# Patient Record
Sex: MALE | Race: WHITE | Employment: OTHER | URBAN - NONMETROPOLITAN AREA
[De-identification: names, ages, dates, MRNs, and addresses within clinical notes are randomized per-mention and may not be internally consistent; named-entity substitution may affect disease eponyms.]

---

## 2022-04-06 ENCOUNTER — APPOINTMENT (OUTPATIENT)
Dept: ULTRASOUND IMAGING | Age: 57
DRG: 853 | End: 2022-04-06
Payer: MEDICARE

## 2022-04-06 ENCOUNTER — HOSPITAL ENCOUNTER (INPATIENT)
Age: 57
LOS: 5 days | Discharge: HOME | DRG: 853 | End: 2022-04-11
Attending: INTERNAL MEDICINE | Admitting: INTERNAL MEDICINE
Payer: MEDICARE

## 2022-04-06 ENCOUNTER — APPOINTMENT (OUTPATIENT)
Dept: CT IMAGING | Age: 57
DRG: 853 | End: 2022-04-06
Payer: MEDICARE

## 2022-04-06 DIAGNOSIS — K81.0 ACUTE CHOLECYSTITIS: Primary | ICD-10-CM

## 2022-04-06 LAB
ALBUMIN SERPL-MCNC: 4.6 G/DL (ref 3.5–5.1)
ALP BLD-CCNC: 191 U/L (ref 38–126)
ALT SERPL-CCNC: 165 U/L (ref 11–66)
ANION GAP SERPL CALCULATED.3IONS-SCNC: 21 MEQ/L (ref 8–16)
AST SERPL-CCNC: 159 U/L (ref 5–40)
AVERAGE GLUCOSE: 171 MG/DL (ref 70–126)
BACTERIA: ABNORMAL /HPF
BASE EXCESS MIXED: -0.4 MMOL/L (ref -2–3)
BASOPHILS # BLD: 0.7 %
BASOPHILS ABSOLUTE: 0.1 THOU/MM3 (ref 0–0.1)
BILIRUB SERPL-MCNC: 2.9 MG/DL (ref 0.3–1.2)
BILIRUBIN DIRECT: 1.9 MG/DL (ref 0–0.3)
BILIRUBIN URINE: NEGATIVE
BLOOD, URINE: NEGATIVE
BUN BLDV-MCNC: 9 MG/DL (ref 7–22)
CALCIUM SERPL-MCNC: 10.3 MG/DL (ref 8.5–10.5)
CASTS 2: ABNORMAL /LPF
CASTS UA: ABNORMAL /LPF
CHARACTER, URINE: CLEAR
CHLORIDE BLD-SCNC: 95 MEQ/L (ref 98–111)
CO2: 24 MEQ/L (ref 23–33)
COLLECTED BY:: ABNORMAL
COLOR: YELLOW
CREAT SERPL-MCNC: 0.8 MG/DL (ref 0.4–1.2)
CRYSTALS, UA: ABNORMAL
EKG ATRIAL RATE: 81 BPM
EKG P AXIS: 38 DEGREES
EKG P-R INTERVAL: 160 MS
EKG Q-T INTERVAL: 360 MS
EKG QRS DURATION: 88 MS
EKG QTC CALCULATION (BAZETT): 418 MS
EKG R AXIS: 71 DEGREES
EKG T AXIS: 69 DEGREES
EKG VENTRICULAR RATE: 81 BPM
EOSINOPHIL # BLD: 0.2 %
EOSINOPHILS ABSOLUTE: 0 THOU/MM3 (ref 0–0.4)
EPITHELIAL CELLS, UA: ABNORMAL /HPF
ERYTHROCYTE [DISTWIDTH] IN BLOOD BY AUTOMATED COUNT: 14.9 % (ref 11.5–14.5)
ERYTHROCYTE [DISTWIDTH] IN BLOOD BY AUTOMATED COUNT: 45.5 FL (ref 35–45)
GFR SERPL CREATININE-BSD FRML MDRD: > 90 ML/MIN/1.73M2
GLUCOSE BLD-MCNC: 161 MG/DL (ref 70–108)
GLUCOSE BLD-MCNC: 259 MG/DL (ref 70–108)
GLUCOSE URINE: >= 1000 MG/DL
HBA1C MFR BLD: 7.7 % (ref 4.4–6.4)
HCO3, MIXED: 25 MMOL/L (ref 23–28)
HCT VFR BLD CALC: 61 % (ref 42–52)
HEMOGLOBIN: 19.7 GM/DL (ref 14–18)
IMMATURE GRANS (ABS): 0.1 THOU/MM3 (ref 0–0.07)
IMMATURE GRANULOCYTES: 0.9 %
KETONES, URINE: 40
LACTIC ACID: 2.1 MMOL/L (ref 0.5–2)
LEUKOCYTE ESTERASE, URINE: NEGATIVE
LIPASE: 44.4 U/L (ref 5.6–51.3)
LIPASE: 49.1 U/L (ref 5.6–51.3)
LYMPHOCYTES # BLD: 9.8 %
LYMPHOCYTES ABSOLUTE: 1.1 THOU/MM3 (ref 1–4.8)
MCH RBC QN AUTO: 29 PG (ref 26–33)
MCHC RBC AUTO-ENTMCNC: 32.3 GM/DL (ref 32.2–35.5)
MCV RBC AUTO: 89.8 FL (ref 80–94)
MISCELLANEOUS 2: ABNORMAL
MONOCYTES # BLD: 5.4 %
MONOCYTES ABSOLUTE: 0.6 THOU/MM3 (ref 0.4–1.3)
NITRITE, URINE: NEGATIVE
NUCLEATED RED BLOOD CELLS: 0 /100 WBC
O2 SAT, MIXED: 86 %
OSMOLALITY CALCULATION: 287 MOSMOL/KG (ref 275–300)
PATHOLOGIST REVIEW: ABNORMAL
PCO2, MIXED VENOUS: 42 MMHG (ref 41–51)
PH UA: 7.5 (ref 5–9)
PH, MIXED: 7.38 (ref 7.31–7.41)
PLATELET # BLD: 196 THOU/MM3 (ref 130–400)
PMV BLD AUTO: 9.9 FL (ref 9.4–12.4)
PO2 MIXED: 52 MMHG (ref 25–40)
POTASSIUM REFLEX MAGNESIUM: 3.8 MEQ/L (ref 3.5–5.2)
PROCALCITONIN: 0.13 NG/ML (ref 0.01–0.09)
PROTEIN UA: ABNORMAL
RBC # BLD: 6.79 MILL/MM3 (ref 4.7–6.1)
RBC URINE: ABNORMAL /HPF
RENAL EPITHELIAL, UA: ABNORMAL
SCAN OF BLOOD SMEAR: NORMAL
SEG NEUTROPHILS: 83 %
SEGMENTED NEUTROPHILS ABSOLUTE COUNT: 9.4 THOU/MM3 (ref 1.8–7.7)
SODIUM BLD-SCNC: 140 MEQ/L (ref 135–145)
SPECIFIC GRAVITY, URINE: > 1.03 (ref 1–1.03)
TOTAL PROTEIN: 8.7 G/DL (ref 6.1–8)
TROPONIN T: < 0.01 NG/ML
UROBILINOGEN, URINE: 1 EU/DL (ref 0–1)
WBC # BLD: 11.3 THOU/MM3 (ref 4.8–10.8)
WBC UA: ABNORMAL /HPF
YEAST: ABNORMAL

## 2022-04-06 PROCEDURE — 93010 ELECTROCARDIOGRAM REPORT: CPT | Performed by: INTERNAL MEDICINE

## 2022-04-06 PROCEDURE — 93306 TTE W/DOPPLER COMPLETE: CPT

## 2022-04-06 PROCEDURE — 6360000004 HC RX CONTRAST MEDICATION: Performed by: PHYSICIAN ASSISTANT

## 2022-04-06 PROCEDURE — 36415 COLL VENOUS BLD VENIPUNCTURE: CPT

## 2022-04-06 PROCEDURE — 74177 CT ABD & PELVIS W/CONTRAST: CPT

## 2022-04-06 PROCEDURE — 99284 EMERGENCY DEPT VISIT MOD MDM: CPT

## 2022-04-06 PROCEDURE — 99223 1ST HOSP IP/OBS HIGH 75: CPT | Performed by: INTERNAL MEDICINE

## 2022-04-06 PROCEDURE — 83690 ASSAY OF LIPASE: CPT

## 2022-04-06 PROCEDURE — 81001 URINALYSIS AUTO W/SCOPE: CPT

## 2022-04-06 PROCEDURE — 6360000002 HC RX W HCPCS: Performed by: PHYSICIAN ASSISTANT

## 2022-04-06 PROCEDURE — 94761 N-INVAS EAR/PLS OXIMETRY MLT: CPT

## 2022-04-06 PROCEDURE — 93005 ELECTROCARDIOGRAM TRACING: CPT | Performed by: PHYSICIAN ASSISTANT

## 2022-04-06 PROCEDURE — 85025 COMPLETE CBC W/AUTO DIFF WBC: CPT

## 2022-04-06 PROCEDURE — 6370000000 HC RX 637 (ALT 250 FOR IP): Performed by: INTERNAL MEDICINE

## 2022-04-06 PROCEDURE — 84145 PROCALCITONIN (PCT): CPT

## 2022-04-06 PROCEDURE — 80048 BASIC METABOLIC PNL TOTAL CA: CPT

## 2022-04-06 PROCEDURE — 6360000002 HC RX W HCPCS: Performed by: INTERNAL MEDICINE

## 2022-04-06 PROCEDURE — 82803 BLOOD GASES ANY COMBINATION: CPT

## 2022-04-06 PROCEDURE — 2700000000 HC OXYGEN THERAPY PER DAY

## 2022-04-06 PROCEDURE — 76705 ECHO EXAM OF ABDOMEN: CPT

## 2022-04-06 PROCEDURE — 2580000003 HC RX 258: Performed by: INTERNAL MEDICINE

## 2022-04-06 PROCEDURE — 2580000003 HC RX 258: Performed by: PHYSICIAN ASSISTANT

## 2022-04-06 PROCEDURE — 83605 ASSAY OF LACTIC ACID: CPT

## 2022-04-06 PROCEDURE — 6360000002 HC RX W HCPCS: Performed by: SURGERY

## 2022-04-06 PROCEDURE — 96375 TX/PRO/DX INJ NEW DRUG ADDON: CPT

## 2022-04-06 PROCEDURE — 96374 THER/PROPH/DIAG INJ IV PUSH: CPT

## 2022-04-06 PROCEDURE — 84484 ASSAY OF TROPONIN QUANT: CPT

## 2022-04-06 PROCEDURE — 83036 HEMOGLOBIN GLYCOSYLATED A1C: CPT

## 2022-04-06 PROCEDURE — 82948 REAGENT STRIP/BLOOD GLUCOSE: CPT

## 2022-04-06 PROCEDURE — 99221 1ST HOSP IP/OBS SF/LOW 40: CPT | Performed by: SURGERY

## 2022-04-06 PROCEDURE — 1200000003 HC TELEMETRY R&B

## 2022-04-06 PROCEDURE — 80076 HEPATIC FUNCTION PANEL: CPT

## 2022-04-06 RX ORDER — ONDANSETRON 2 MG/ML
4 INJECTION INTRAMUSCULAR; INTRAVENOUS EVERY 30 MIN PRN
Status: DISCONTINUED | OUTPATIENT
Start: 2022-04-06 | End: 2022-04-06

## 2022-04-06 RX ORDER — DEXTROSE MONOHYDRATE 25 G/50ML
12.5 INJECTION, SOLUTION INTRAVENOUS PRN
Status: DISCONTINUED | OUTPATIENT
Start: 2022-04-06 | End: 2022-04-06 | Stop reason: SDUPTHER

## 2022-04-06 RX ORDER — RISPERIDONE 1 MG/1
1 TABLET, FILM COATED ORAL DAILY
COMMUNITY

## 2022-04-06 RX ORDER — FLUTICASONE PROPIONATE 50 MCG
1 SPRAY, SUSPENSION (ML) NASAL DAILY
COMMUNITY

## 2022-04-06 RX ORDER — ASPIRIN 81 MG/1
81 TABLET, CHEWABLE ORAL DAILY
COMMUNITY

## 2022-04-06 RX ORDER — MONTELUKAST SODIUM 10 MG/1
10 TABLET ORAL NIGHTLY
COMMUNITY

## 2022-04-06 RX ORDER — ALBUTEROL SULFATE 2.5 MG/3ML
5 SOLUTION RESPIRATORY (INHALATION) EVERY 4 HOURS PRN
Status: DISCONTINUED | OUTPATIENT
Start: 2022-04-06 | End: 2022-04-11 | Stop reason: HOSPADM

## 2022-04-06 RX ORDER — PANTOPRAZOLE SODIUM 40 MG/10ML
40 INJECTION, POWDER, LYOPHILIZED, FOR SOLUTION INTRAVENOUS DAILY
Status: DISCONTINUED | OUTPATIENT
Start: 2022-04-06 | End: 2022-04-11 | Stop reason: HOSPADM

## 2022-04-06 RX ORDER — DEXTROSE MONOHYDRATE 50 MG/ML
100 INJECTION, SOLUTION INTRAVENOUS PRN
Status: DISCONTINUED | OUTPATIENT
Start: 2022-04-06 | End: 2022-04-11 | Stop reason: HOSPADM

## 2022-04-06 RX ORDER — DEXTROSE MONOHYDRATE 50 MG/ML
100 INJECTION, SOLUTION INTRAVENOUS PRN
Status: DISCONTINUED | OUTPATIENT
Start: 2022-04-06 | End: 2022-04-06 | Stop reason: SDUPTHER

## 2022-04-06 RX ORDER — MORPHINE SULFATE 30 MG/1
30 TABLET, FILM COATED, EXTENDED RELEASE ORAL 3 TIMES DAILY
Status: DISCONTINUED | OUTPATIENT
Start: 2022-04-06 | End: 2022-04-11 | Stop reason: HOSPADM

## 2022-04-06 RX ORDER — PANTOPRAZOLE SODIUM 40 MG/1
40 TABLET, DELAYED RELEASE ORAL 2 TIMES DAILY
COMMUNITY

## 2022-04-06 RX ORDER — BISOPROLOL FUMARATE 5 MG/1
5 TABLET ORAL NIGHTLY
COMMUNITY
End: 2022-05-26

## 2022-04-06 RX ORDER — IPRATROPIUM BROMIDE 21 UG/1
2 SPRAY, METERED NASAL DAILY
COMMUNITY

## 2022-04-06 RX ORDER — TAMSULOSIN HYDROCHLORIDE 0.4 MG/1
0.4 CAPSULE ORAL NIGHTLY
COMMUNITY

## 2022-04-06 RX ORDER — ATORVASTATIN CALCIUM 40 MG/1
40 TABLET, FILM COATED ORAL NIGHTLY
COMMUNITY

## 2022-04-06 RX ORDER — NICOTINE POLACRILEX 4 MG
15 LOZENGE BUCCAL PRN
Status: DISCONTINUED | OUTPATIENT
Start: 2022-04-06 | End: 2022-04-06 | Stop reason: CLARIF

## 2022-04-06 RX ORDER — POTASSIUM CHLORIDE 7.45 MG/ML
10 INJECTION INTRAVENOUS PRN
Status: DISCONTINUED | OUTPATIENT
Start: 2022-04-06 | End: 2022-04-11 | Stop reason: HOSPADM

## 2022-04-06 RX ORDER — ALOGLIPTIN 12.5 MG/1
12.5 TABLET, FILM COATED ORAL DAILY
Status: DISCONTINUED | OUTPATIENT
Start: 2022-04-06 | End: 2022-04-10

## 2022-04-06 RX ORDER — MAGNESIUM SULFATE IN WATER 40 MG/ML
2000 INJECTION, SOLUTION INTRAVENOUS PRN
Status: DISCONTINUED | OUTPATIENT
Start: 2022-04-06 | End: 2022-04-11 | Stop reason: HOSPADM

## 2022-04-06 RX ORDER — BISACODYL 10 MG
10 SUPPOSITORY, RECTAL RECTAL DAILY PRN
Status: DISCONTINUED | OUTPATIENT
Start: 2022-04-06 | End: 2022-04-11 | Stop reason: HOSPADM

## 2022-04-06 RX ORDER — GLIMEPIRIDE 4 MG/1
4 TABLET ORAL
COMMUNITY

## 2022-04-06 RX ORDER — SODIUM CHLORIDE 9 MG/ML
INJECTION, SOLUTION INTRAVENOUS PRN
Status: DISCONTINUED | OUTPATIENT
Start: 2022-04-06 | End: 2022-04-11 | Stop reason: HOSPADM

## 2022-04-06 RX ORDER — DULOXETIN HYDROCHLORIDE 30 MG/1
30 CAPSULE, DELAYED RELEASE ORAL DAILY
Status: DISCONTINUED | OUTPATIENT
Start: 2022-04-07 | End: 2022-04-11 | Stop reason: HOSPADM

## 2022-04-06 RX ORDER — DULOXETIN HYDROCHLORIDE 30 MG/1
30 CAPSULE, DELAYED RELEASE ORAL DAILY
COMMUNITY

## 2022-04-06 RX ORDER — TAMSULOSIN HYDROCHLORIDE 0.4 MG/1
0.4 CAPSULE ORAL NIGHTLY
Status: DISCONTINUED | OUTPATIENT
Start: 2022-04-06 | End: 2022-04-11 | Stop reason: HOSPADM

## 2022-04-06 RX ORDER — HYDROCODONE BITARTRATE AND ACETAMINOPHEN 10; 325 MG/1; MG/1
1 TABLET ORAL 3 TIMES DAILY
COMMUNITY

## 2022-04-06 RX ORDER — FLUTICASONE FUROATE, UMECLIDINIUM BROMIDE AND VILANTEROL TRIFENATATE 100; 62.5; 25 UG/1; UG/1; UG/1
1 POWDER RESPIRATORY (INHALATION) DAILY
COMMUNITY

## 2022-04-06 RX ORDER — SODIUM CHLORIDE 0.9 % (FLUSH) 0.9 %
5-40 SYRINGE (ML) INJECTION EVERY 12 HOURS SCHEDULED
Status: DISCONTINUED | OUTPATIENT
Start: 2022-04-06 | End: 2022-04-11 | Stop reason: HOSPADM

## 2022-04-06 RX ORDER — KETOCONAZOLE 20 MG/ML
SHAMPOO TOPICAL DAILY PRN
COMMUNITY

## 2022-04-06 RX ORDER — 0.9 % SODIUM CHLORIDE 0.9 %
1000 INTRAVENOUS SOLUTION INTRAVENOUS ONCE
Status: DISCONTINUED | OUTPATIENT
Start: 2022-04-06 | End: 2022-04-08

## 2022-04-06 RX ORDER — METOPROLOL TARTRATE 50 MG/1
50 TABLET, FILM COATED ORAL NIGHTLY
Status: ON HOLD | COMMUNITY
End: 2022-04-07

## 2022-04-06 RX ORDER — MORPHINE SULFATE 30 MG/1
30 TABLET ORAL 3 TIMES DAILY
Status: ON HOLD | COMMUNITY
End: 2022-04-06 | Stop reason: ALTCHOICE

## 2022-04-06 RX ORDER — LORAZEPAM 0.5 MG/1
0.5 TABLET ORAL NIGHTLY
COMMUNITY

## 2022-04-06 RX ORDER — MORPHINE SULFATE 30 MG/1
30 TABLET, FILM COATED, EXTENDED RELEASE ORAL 3 TIMES DAILY
COMMUNITY

## 2022-04-06 RX ORDER — PREGABALIN 200 MG/1
200 CAPSULE ORAL 3 TIMES DAILY
COMMUNITY

## 2022-04-06 RX ORDER — SODIUM CHLORIDE, SODIUM LACTATE, POTASSIUM CHLORIDE, CALCIUM CHLORIDE 600; 310; 30; 20 MG/100ML; MG/100ML; MG/100ML; MG/100ML
INJECTION, SOLUTION INTRAVENOUS CONTINUOUS
Status: DISCONTINUED | OUTPATIENT
Start: 2022-04-06 | End: 2022-04-07

## 2022-04-06 RX ORDER — POTASSIUM CHLORIDE 20 MEQ/1
40 TABLET, EXTENDED RELEASE ORAL PRN
Status: DISCONTINUED | OUTPATIENT
Start: 2022-04-06 | End: 2022-04-11 | Stop reason: HOSPADM

## 2022-04-06 RX ORDER — MELOXICAM 15 MG/1
15 TABLET ORAL DAILY
COMMUNITY

## 2022-04-06 RX ORDER — SODIUM CHLORIDE 0.9 % (FLUSH) 0.9 %
5-40 SYRINGE (ML) INJECTION PRN
Status: DISCONTINUED | OUTPATIENT
Start: 2022-04-06 | End: 2022-04-11 | Stop reason: HOSPADM

## 2022-04-06 RX ORDER — 0.9 % SODIUM CHLORIDE 0.9 %
1000 INTRAVENOUS SOLUTION INTRAVENOUS ONCE
Status: COMPLETED | OUTPATIENT
Start: 2022-04-06 | End: 2022-04-06

## 2022-04-06 RX ORDER — ACETAMINOPHEN 650 MG/1
650 SUPPOSITORY RECTAL EVERY 6 HOURS PRN
Status: DISCONTINUED | OUTPATIENT
Start: 2022-04-06 | End: 2022-04-11 | Stop reason: HOSPADM

## 2022-04-06 RX ORDER — POLYETHYLENE GLYCOL 3350 17 G/17G
17 POWDER, FOR SOLUTION ORAL DAILY PRN
Status: DISCONTINUED | OUTPATIENT
Start: 2022-04-06 | End: 2022-04-11 | Stop reason: HOSPADM

## 2022-04-06 RX ORDER — ACETAMINOPHEN 325 MG/1
650 TABLET ORAL EVERY 6 HOURS PRN
Status: DISCONTINUED | OUTPATIENT
Start: 2022-04-06 | End: 2022-04-11 | Stop reason: HOSPADM

## 2022-04-06 RX ORDER — NICOTINE POLACRILEX 4 MG
15 LOZENGE BUCCAL PRN
Status: DISCONTINUED | OUTPATIENT
Start: 2022-04-06 | End: 2022-04-06 | Stop reason: SDUPTHER

## 2022-04-06 RX ORDER — MONTELUKAST SODIUM 10 MG/1
10 TABLET ORAL NIGHTLY
Status: DISCONTINUED | OUTPATIENT
Start: 2022-04-06 | End: 2022-04-11 | Stop reason: HOSPADM

## 2022-04-06 RX ORDER — DEXTROSE MONOHYDRATE 25 G/50ML
12.5 INJECTION, SOLUTION INTRAVENOUS PRN
Status: DISCONTINUED | OUTPATIENT
Start: 2022-04-06 | End: 2022-04-06 | Stop reason: CLARIF

## 2022-04-06 RX ORDER — ATORVASTATIN CALCIUM 40 MG/1
40 TABLET, FILM COATED ORAL DAILY
Status: DISCONTINUED | OUTPATIENT
Start: 2022-04-06 | End: 2022-04-11 | Stop reason: HOSPADM

## 2022-04-06 RX ORDER — HYDROCODONE BITARTRATE AND ACETAMINOPHEN 10; 325 MG/1; MG/1
1 TABLET ORAL 3 TIMES DAILY
Status: DISCONTINUED | OUTPATIENT
Start: 2022-04-06 | End: 2022-04-10

## 2022-04-06 RX ORDER — RISPERIDONE 1 MG/1
1 TABLET, FILM COATED ORAL DAILY
Status: DISCONTINUED | OUTPATIENT
Start: 2022-04-07 | End: 2022-04-11 | Stop reason: HOSPADM

## 2022-04-06 RX ORDER — DESONIDE 0.5 MG/G
CREAM TOPICAL 2 TIMES DAILY
COMMUNITY

## 2022-04-06 RX ADMIN — HYDROMORPHONE HYDROCHLORIDE 1 MG: 1 INJECTION, SOLUTION INTRAMUSCULAR; INTRAVENOUS; SUBCUTANEOUS at 21:10

## 2022-04-06 RX ADMIN — ONDANSETRON 4 MG: 2 INJECTION INTRAMUSCULAR; INTRAVENOUS at 13:38

## 2022-04-06 RX ADMIN — METOPROLOL TARTRATE 25 MG: 50 TABLET, FILM COATED ORAL at 20:55

## 2022-04-06 RX ADMIN — MONTELUKAST SODIUM 10 MG: 10 TABLET, FILM COATED ORAL at 20:55

## 2022-04-06 RX ADMIN — IOPAMIDOL 80 ML: 755 INJECTION, SOLUTION INTRAVENOUS at 14:02

## 2022-04-06 RX ADMIN — MORPHINE SULFATE 30 MG: 30 TABLET, FILM COATED, EXTENDED RELEASE ORAL at 20:55

## 2022-04-06 RX ADMIN — INSULIN LISPRO 1 UNITS: 100 INJECTION, SOLUTION INTRAVENOUS; SUBCUTANEOUS at 21:05

## 2022-04-06 RX ADMIN — TAMSULOSIN HYDROCHLORIDE 0.4 MG: 0.4 CAPSULE ORAL at 20:55

## 2022-04-06 RX ADMIN — SODIUM CHLORIDE 1000 ML: 9 INJECTION, SOLUTION INTRAVENOUS at 13:44

## 2022-04-06 RX ADMIN — ENOXAPARIN SODIUM 40 MG: 100 INJECTION SUBCUTANEOUS at 20:56

## 2022-04-06 RX ADMIN — SODIUM CHLORIDE, POTASSIUM CHLORIDE, SODIUM LACTATE AND CALCIUM CHLORIDE: 600; 310; 30; 20 INJECTION, SOLUTION INTRAVENOUS at 17:38

## 2022-04-06 RX ADMIN — SODIUM CHLORIDE, PRESERVATIVE FREE 10 ML: 5 INJECTION INTRAVENOUS at 20:56

## 2022-04-06 RX ADMIN — PREGABALIN 200 MG: 75 CAPSULE ORAL at 20:56

## 2022-04-06 RX ADMIN — HYDROMORPHONE HYDROCHLORIDE 1 MG: 1 INJECTION, SOLUTION INTRAMUSCULAR; INTRAVENOUS; SUBCUTANEOUS at 13:44

## 2022-04-06 ASSESSMENT — PAIN DESCRIPTION - LOCATION
LOCATION: ABDOMEN;FLANK

## 2022-04-06 ASSESSMENT — ENCOUNTER SYMPTOMS
COUGH: 0
SHORTNESS OF BREATH: 0
COLOR CHANGE: 0
SORE THROAT: 0
DIARRHEA: 0
NAUSEA: 0
ABDOMINAL PAIN: 1
VOMITING: 0
CONSTIPATION: 0
CHEST TIGHTNESS: 0
ABDOMINAL DISTENTION: 0

## 2022-04-06 ASSESSMENT — PAIN DESCRIPTION - ORIENTATION
ORIENTATION: MID;LEFT;RIGHT
ORIENTATION: RIGHT;LEFT;MID

## 2022-04-06 ASSESSMENT — PAIN DESCRIPTION - FREQUENCY
FREQUENCY: CONTINUOUS

## 2022-04-06 ASSESSMENT — PAIN SCALES - GENERAL
PAINLEVEL_OUTOF10: 9
PAINLEVEL_OUTOF10: 3
PAINLEVEL_OUTOF10: 5

## 2022-04-06 ASSESSMENT — PAIN DESCRIPTION - PAIN TYPE
TYPE: ACUTE PAIN;CHRONIC PAIN
TYPE: ACUTE PAIN;CHRONIC PAIN

## 2022-04-06 ASSESSMENT — PAIN DESCRIPTION - DESCRIPTORS
DESCRIPTORS: DISCOMFORT
DESCRIPTORS: DISCOMFORT
DESCRIPTORS: STABBING

## 2022-04-06 NOTE — ED NOTES
Pt resting in room with wife, pt is now on tele monitor. Pt was at 88% SPO2 on room air so pt was put on nasal cannula 2L/min and is now at 98% SPO2. VSS and no further requests from pt at this time.       Reid Appl  04/06/22 [id number]

## 2022-04-06 NOTE — ED TRIAGE NOTES
Pt presents to ED with abdominal pain and flank pain that began last week. Pt states that the pain worsened this morning. Pt states he has been drinking well but has not eaten since last night. Pt denies any painful urination but stated his urine was \"dark\" last week but has been normal recently. Pt denies any vomiting or loose stools. IV inserted and EKG was taken. VSS and pt resting in room with wife.

## 2022-04-06 NOTE — H&P
Hospitalist History and Physical          Patient: Eliza Pradhan  : 1965  MRN: [id number]     Acct: [de-identified]    PCP: No primary care provider on file. Date of Admission: 2022  Date of Service: Pt seen/examined on 22  and Admitted to Inpatient with expected LOS greater than two midnights due to medical therapy. Hospital Problems           Last Modified POA    * (Principal) Acute cholecystitis 2022 Yes          Assessment and Plan:   Acute Cholecystitis: ?cholangitis, + cholecystitis but no biliary dilation seen on CT abd/pelv. Lipase wnl, hepatitis as noted below. Suspect patient also passed a stone about a week ago as well based on his symptoms he describes. o Empiric Zosyn, f/up liver US  o Cardiology consult for preop eval  o NPO, IVF (repeat bolus followed by maintenance)  o D/w Gen Surgery - tentatively planning surgery for Friday pending Cardio eval  o Dilaudid 1/1.5mg PRN (may need higher dose given significant home pain requirements). Bowel regimen is available.  Obstructive Transaminitis (on history of FLD): likely secondary to above. Alk phos 191, , , bilirubin 2.9 with a direct bilirubin of 1.9. There was no biliary ductal dilatation, possible passed stone. o Will follow up on US liver. o Trend lfts  o Avoid hepatotoxins   ? Sepsis: with WBC, near tachycardia secondary to above. See above for management  o Check lactic acid. Aggressive fluids. Antibiotics.  Elevated anion gap: will check VBG to determine if there is acidosis present. Bicarb on BMP is wnl, but does not rule out a mixed disorder. Patient does take SGLT2 inhibitor so euglycemic DKA would be in differential diagnosis.  Steatosis/Hepatomegaly: noted. Advise weight loss.  Asthma: no in acute exacerbation. He is on singulair and Trelegy, but takes the inhaler prn.   o Will start albuterol prn and IS   Abnormal EKG / CAD s/p 4v CABG: CABG in  with Dr. Jimy Yu. Reports he has not seen a Cardiologist for some time. He takes ASA and two BB (lopressor and bisoprolol) in addition to statin  o Continue on ASA if OK with gen surgery  o EKG reviewed - no baseline - there is TWF in II, V3 and TWI in I, AVL V1-2  - Could be stress response, patient denies chest pain  - Continue ASA for now  o Consult Cardiology given unknown history and high risk  o Obtain echo for baseline  o Initial troponin is negative.  DM2 with hyperglycemia: on farxiga, januvia, trulicity and amaryl PTA. o Hold oral meds  o SSI while npo, hypoglycemia protocol  o Checking A1c.    Erythrocytosis: likely 2/2 dehydration, however patient is on testosterone so may need to stop this therapy if remains. o Monitor CBC   Depression: on risperdal, cymbalta - continue   GEENA with inability to tolerate CPAP. Will need close monitoring postop.  Anxiety: on lorazapam nightly 0.5mg.  Chronic Back Pain: patient is chronically on MS Contin 30mg TID, Lyrica, Mobic, and Norco 10mg 5x daily prn. He follows his PCP for this. I reviewed PDMP.  Morbid Obesity: BMI 41, advise weight loss.  Former tobacco          =======================================================================      Chief Complaint:  Abdominal pain    History Of Present Illness:  Magdalena Aguilar is a 61 y.o. male with PMHx of asthma, obesity, GEENA with noncompliance, CAD status post CABG, former tobacco abuse who presents to [facility] with abdominal pain. Patient reports that this started last night suddenly and was described as a sharp pain in the right upper quadrant which radiated along his costal margin to the right posteriorly. Nothing made it better or worse, he reports a poor appetite and did not eat much during this time. He had some nausea but denied any vomiting. Admitted to loose stools.   He reports that he had similar symptoms about a week ago which lasted 2 to 3 days and spontaneously resolved on their own.  During that time he had dark/orange urine. He denies any associated fevers or chills, shortness of breath, chest pain or palpitations. He has chronic dyspnea on exertion that he attributes to his asthma however this is not become any worse. The patient is a former tobacco user. He had a CABG in 2013 and is maintained on aspirin and bisoprolol/metoprolol and is currently followed by his PCP, has not seen a cardiologist in some time. Denies any anginal chest pain. He also has chronic back pain and prior surgery, maintained on MS Contin 30 mg 3 times daily, Norco  as needed, Lyrica, and Mobic by his PCP. He does not drink any alcohol. ED course: Patient's vital signs revealed afebrile with hypertension. BMP showed anion gap of 21 however CO2 level within normal limits. Glucose was 259, procalcitonin 0.13. AST and  and 165, alk phos 191, bilirubin 2.9. WBC 11.3 and hemoglobin 19.7 with a hematocrit of 61. UA positive for ketones, greater than 1000 glucose, trace protein. CT of the abdomen pelvis with IV contrast showed \"marked gallbladder distention with high density sludge/stones layering within the gallbladder and mild pericholecystic edema suggests acute cholecystitis in the appropriate clinical setting. No biliary ductal dilatation is observed\"    Past Medical History:    History reviewed. No pertinent past medical history. Past Surgical History:    History reviewed. No pertinent surgical history. Medications Prior to Admission:   Prior to Admission medications    Medication Sig Start Date End Date Taking?  Authorizing Provider   fluticasone-umeclidin-vilant (TRELEGY ELLIPTA) 100-62.5-25 MCG/INH AEPB Inhale 1 puff into the lungs daily   Yes Historical Provider, MD   mometasone-formoterol (DULERA) 200-5 MCG/ACT inhaler Inhale 2 puffs into the lungs every 12 hours   Yes Historical Provider, MD   fluticasone (FLONASE) 50 MCG/ACT nasal spray 1 spray by Each Nostril route daily Yes Historical Provider, MD   Dulaglutide (TRULICITY SC) Inject into the skin once a week   Yes Historical Provider, MD   glimepiride (AMARYL) 4 MG tablet Take 4 mg by mouth every morning (before breakfast)   Yes Historical Provider, MD   risperiDONE (RISPERDAL) 1 MG tablet Take 1 mg by mouth daily   Yes Historical Provider, MD   DULoxetine (CYMBALTA) 30 MG extended release capsule Take 30 mg by mouth daily   Yes Historical Provider, MD   HYDROcodone-acetaminophen (NORCO)  MG per tablet Take 1 tablet by mouth in the morning, at noon, and at bedtime. Yes Historical Provider, MD   pregabalin (LYRICA) 200 MG capsule Take 200 mg by mouth in the morning, at noon, and at bedtime. Yes Historical Provider, MD   morphine (MSIR) 30 MG tablet Take 30 mg by mouth in the morning, at noon, and at bedtime. Yes Historical Provider, MD   montelukast (SINGULAIR) 10 MG tablet Take 10 mg by mouth nightly   Yes Historical Provider, MD   tamsulosin (FLOMAX) 0.4 MG capsule Take 0.4 mg by mouth daily   Yes Historical Provider, MD   atorvastatin (LIPITOR) 40 MG tablet Take 40 mg by mouth daily   Yes Historical Provider, MD   pantoprazole (PROTONIX) 40 MG tablet Take 40 mg by mouth daily   Yes Historical Provider, MD   dapagliflozin (FARXIGA) 10 MG tablet Take 10 mg by mouth every morning   Yes Historical Provider, MD   bisoprolol (ZEBETA) 5 MG tablet Take 5 mg by mouth daily   Yes Historical Provider, MD   SITagliptin (JANUVIA) 100 MG tablet Take 100 mg by mouth daily   Yes Historical Provider, MD   metoprolol tartrate (LOPRESSOR) 50 MG tablet Take 50 mg by mouth daily   Yes Historical Provider, MD   LORazepam (ATIVAN) 0.5 MG tablet Take 0.5 mg by mouth daily as needed for Anxiety. Yes Historical Provider, MD   meloxicam (MOBIC) 15 MG tablet Take 15 mg by mouth daily   Yes Historical Provider, MD   ketoconazole (NIZORAL) 2 % shampoo Apply topically daily as needed for Itching Apply topically daily as needed.    Yes Historical Provider, MD   desonide (DESOWEN) 0.05 % cream Apply topically 2 times daily Apply topically 2 times daily. Yes Historical Provider, MD   ipratropium (ATROVENT) 0.03 % nasal spray 2 sprays by Each Nostril route daily   Yes Historical Provider, MD       Allergies:  Abilify [aripiprazole] and Metformin and related    Social History:    The patient currently lives home. Tobacco use:   reports that he has never smoked. He has never used smokeless tobacco.  Alcohol use:   reports current alcohol use. Drug use:  reports no history of drug use. Family History:   as follows:  History reviewed. No pertinent family history. Review of Systems:   Pertinent positives and negatives as noted in the HPI. All other systems reviewed and negative. Physical Exam:    BP (!) 157/88   Pulse 84   Temp 98.6 °F (37 °C) (Oral)   Resp 18   Ht 5' 8\" (1.727 m)   Wt 270 lb (122.5 kg)   SpO2 98%   BMI 41.05 kg/m²       General appearance: Obese, mild distress  Eyes:  Pupils equal, round, and reactive to light. Conjunctivae/corneas clear. HENT: Head normal in appearance. External nares normal.  Oral mucosa Dry without lesions. Hearing grossly intact. Neck: Supple, with full range of motion. Trachea midline. No gross JVD appreciated. Respiratory:  Normal respiratory effort. Clear to auscultation, bilaterally without rales or wheezes or rhonchi. Diminished overall. Cardiovascular: Normal rate, regular rhythm with normal S1/S2 without murmurs. No lower extremity edema. Abdomen: Normoactive bs. Protuberant, there is pain to palpation in the RUQ. No rebound. Mild distension  Musculoskeletal: There is no joint swelling or tenderness. Normal tone. No abnormal movements. Skin: Warm and dry. Violaceous macules of the bilateral shins anteriorly. Neurologic:  No focal sensory/motor deficits in the upper and lower extremities. Cranial nerves:  grossly non-focal 2-12.      Psychiatric: Alert and oriented, normal insight and thought content. Capillary Refill: Brisk,< 3 seconds. Peripheral Pulses: +2 palpable, equal bilaterally. Labs:     Recent Labs     04/06/22  1240   WBC 11.3*   HGB 19.7*   HCT 61.0*        Recent Labs     04/06/22  1240      K 3.8   CL 95*   CO2 24   BUN 9   CREATININE 0.8   CALCIUM 10.3     Recent Labs     04/06/22  1240   *   *   BILIDIR 1.9*   BILITOT 2.9*   ALKPHOS 191*     No results for input(s): INR in the last 72 hours. No results for input(s): Abiel Oiler in the last 72 hours. Lab Results   Component Value Date    NITRU NEGATIVE 04/06/2022    WBCUA NONE SEEN 04/06/2022    BACTERIA NONE SEEN 04/06/2022    RBCUA 0-2 04/06/2022    BLOODU NEGATIVE 04/06/2022    GLUCOSEU >= 1000 04/06/2022         Radiology:     CT ABDOMEN PELVIS W IV CONTRAST Additional Contrast? None   Final Result   1. Marked gallbladder distention with high density sludge/stones layering within the gallbladder and mild pericholecystic edema suggests acute cholecystitis in the appropriate clinical setting. No biliary ductal dilatation is observed. Correlate with    liver function tests. 2. Normal appendix. Colonic diverticulosis without diverticulitis. Moderate retained fecal material seen throughout the colon suggesting fecal stasis. No bowel obstruction. 3. Chronic findings are discussed. **This report has been created using voice recognition software. It may contain minor errors which are inherent in voice recognition technology. **      Final report electronically signed by Dr Herminia Hwang on 4/6/2022 2:19 PM      US ABDOMEN COMPLETE    (Results Pending)          EKG:  I have reviewed the EKG with the following interpretation: T wave inversion in 1, aVL, V3, T wave flattening V1 through 2 and inferior leads.       PT/OT Eval Status:  will be assessed  Diet: No diet orders on file  DVT prophylaxis: heparin subq, hold preop  Code Status: No Order  Disposition: admit to med surge     Thank you No primary care provider on file. for the opportunity to be involved in this patient's care.     Electronically signed by Dany Perez DO on 4/6/2022 at 3:07 PM.

## 2022-04-06 NOTE — ED PROVIDER NOTES
[facility]    EMERGENCY MEDICINE     Pt Name: Jalil Colon  MRN: [id number]  Armskaitygfkathleen 1965  Date of evaluation: 4/6/2022  Provider: Jackeline Haskins PA-C    CHIEF COMPLAINT       Chief Complaint   Patient presents with    Abdominal Pain    Flank Pain       HISTORY OF PRESENT ILLNESS    Jalil Colon is a pleasant 61 y.o. male who presents to the emergency department for right upper quadrant abdominal and right flank pain. Patient reports he had similar abdominal pain about a week ago that was not as severe and noted dark urine during this time. After about 2 days the pain dissipated. The current episode of abdominal pain started last night around 2 AM after he ate something at 11 PM and has continued to increase in intensity prompting him to come in this morning. The pain is mainly upper abdomen but worse in the right side. He feels short of breath secondary to pain and states it hurts his stomach when he takes a breath in. He complains of being diaphoretic but denies chest pain, nausea, vomiting, fevers/chills, diarrhea, constipation, or urinary symptoms. Currently he rates the pain 9/10 sharp and dull ache. He is in pain management for prior back surgeries and takes morphine 30 mg 3 times daily and Norco 10 mg up to 4 times daily as needed. Patient also has history of diabetes and COPD. He has inhaler at home in which he uses daily for his COPD. Triage notes and Nursing notes were reviewed by myself. Any discrepancies are addressed above.     PAST MEDICAL HISTORY     Past Medical History:   Diagnosis Date    CAD (coronary artery disease)     Diabetes mellitus ([facility] [address]     Hyperlipidemia     Hypertension     Movement disorder     Neuromuscular disorder ([facility]     Pneumonia     Psychiatric problem        SURGICAL HISTORY       Past Surgical History:   Procedure Laterality Date    BACK SURGERY      CARDIAC SURGERY      COLONOSCOPY      ENDOSCOPY, COLON, DIAGNOSTIC CURRENT MEDICATIONS       Current Discharge Medication List      CONTINUE these medications which have NOT CHANGED    Details   metoprolol succinate (TOPROL XL) 50 MG extended release tablet Take 50 mg by mouth daily      fluticasone-umeclidin-vilant (TRELEGY ELLIPTA) 100-62.5-25 MCG/INH AEPB Inhale 1 puff into the lungs daily      fluticasone (FLONASE) 50 MCG/ACT nasal spray 1 spray by Each Nostril route daily      Dulaglutide (TRULICITY SC) Inject into the skin once a week Patient takes each Sunday evening      glimepiride (AMARYL) 4 MG tablet Take 4 mg by mouth every morning (before breakfast)      risperiDONE (RISPERDAL) 1 MG tablet Take 1 mg by mouth daily      DULoxetine (CYMBALTA) 30 MG extended release capsule Take 30 mg by mouth daily      HYDROcodone-acetaminophen (NORCO)  MG per tablet Take 1 tablet by mouth in the morning, at noon, and at bedtime. pregabalin (LYRICA) 200 MG capsule Take 200 mg by mouth in the morning, at noon, and at bedtime. montelukast (SINGULAIR) 10 MG tablet Take 10 mg by mouth nightly      tamsulosin (FLOMAX) 0.4 MG capsule Take 0.4 mg by mouth at bedtime       atorvastatin (LIPITOR) 40 MG tablet Take 40 mg by mouth nightly       pantoprazole (PROTONIX) 40 MG tablet Take 40 mg by mouth 2 times daily       bisoprolol (ZEBETA) 5 MG tablet Take 5 mg by mouth at bedtime       SITagliptin (JANUVIA) 100 MG tablet Take 100 mg by mouth at bedtime       LORazepam (ATIVAN) 0.5 MG tablet Take 0.5 mg by mouth at bedtime. meloxicam (MOBIC) 15 MG tablet Take 15 mg by mouth daily      ketoconazole (NIZORAL) 2 % shampoo Apply topically daily as needed for Itching Apply topically daily as needed. desonide (DESOWEN) 0.05 % cream Apply topically 2 times daily Apply topically 2 times daily.       ipratropium (ATROVENT) 0.03 % nasal spray 2 sprays by Each Nostril route daily      aspirin 81 MG chewable tablet Take 81 mg by mouth daily      morphine (MS CONTIN) 30 MG extended release tablet Take 30 mg by mouth in the morning, at noon, and at bedtime. Morning, noon and bedtime      dapagliflozin (FARXIGA) 10 MG tablet Take 10 mg by mouth every morning             ALLERGIES     Abilify [aripiprazole] and Metformin and related    FAMILY HISTORY     History reviewed. No pertinent family history. SOCIAL HISTORY       Social History     Socioeconomic History    Marital status:      Spouse name: None    Number of children: None    Years of education: None    Highest education level: None   Occupational History    None   Tobacco Use    Smoking status: Never Smoker    Smokeless tobacco: Never Used   Vaping Use    Vaping Use: Never used   Substance and Sexual Activity    Alcohol use: Yes     Comment: \"not very often\"    Drug use: Never    Sexual activity: None   Other Topics Concern    None   Social History Narrative    None     Social Determinants of Health     Financial Resource Strain:     Difficulty of Paying Living Expenses: Not on file   Food Insecurity:     Worried About Running Out of Food in the Last Year: Not on file    Bekah of Food in the Last Year: Not on file   Transportation Needs:     Lack of Transportation (Medical): Not on file    Lack of Transportation (Non-Medical):  Not on file   Physical Activity:     Days of Exercise per Week: Not on file    Minutes of Exercise per Session: Not on file   Stress:     Feeling of Stress : Not on file   Social Connections:     Frequency of Communication with Friends and Family: Not on file    Frequency of Social Gatherings with Friends and Family: Not on file    Attends Anabaptism Services: Not on file    Active Member of Clubs or Organizations: Not on file    Attends Club or Organization Meetings: Not on file    Marital Status: Not on file   Intimate Partner Violence:     Fear of Current or Ex-Partner: Not on file    Emotionally Abused: Not on file    Physically Abused: Not on file   Joel Sexually Abused: Not on file   Housing Stability:     Unable to Pay for Housing in the Last Year: Not on file    Number of Dave in the Last Year: Not on file    Unstable Housing in the Last Year: Not on file       REVIEW OF SYSTEMS     Review of Systems   Constitutional: Positive for appetite change and diaphoresis. Negative for chills and fever. HENT: Negative for congestion and sore throat. Respiratory: Negative for cough, chest tightness and shortness of breath. Cardiovascular: Negative for chest pain and palpitations. Gastrointestinal: Positive for abdominal pain. Negative for abdominal distention, constipation, diarrhea, nausea and vomiting. Endocrine: Negative for polyuria. Genitourinary: Negative for difficulty urinating, flank pain, frequency, hematuria and urgency. Musculoskeletal: Negative for arthralgias and myalgias. Skin: Negative for color change and wound. Neurological: Negative for dizziness, weakness and headaches. Psychiatric/Behavioral: Negative. All other systems reviewed and are negative. Except as noted above the remainder of the review of systems was reviewed and is. SCREENINGS        [place] Coma Scale  Eye Opening: Spontaneous  Best Verbal Response: Oriented  Best Motor Response: Obeys commands  [place] Coma Scale Score: 15                 PHYSICAL EXAM     INITIAL VITALS:  height is 5' 8\" (1.727 m) and weight is 270 lb (122.5 kg). His oral temperature is 98.4 °F (36.9 °C). His blood pressure is 111/76 and his pulse is 81. His respiration is 16 and oxygen saturation is 97%. Physical Exam  Vitals and nursing note reviewed. Exam conducted with a chaperone present. Constitutional:       General: He is in acute distress. Appearance: Normal appearance. He is normal weight. He is diaphoretic. He is not ill-appearing or toxic-appearing. HENT:      Head: Normocephalic and atraumatic.       Right Ear: External ear normal.      Left Ear: External ear normal.      Nose: Nose normal.      Mouth/Throat:      Mouth: Mucous membranes are moist.   Eyes:      Extraocular Movements: Extraocular movements intact. Pupils: Pupils are equal, round, and reactive to light. Cardiovascular:      Rate and Rhythm: Normal rate and regular rhythm. Pulses: Normal pulses. Heart sounds: Normal heart sounds. No murmur heard. Pulmonary:      Effort: Pulmonary effort is normal. No respiratory distress. Breath sounds: Normal breath sounds. Abdominal:      General: Abdomen is protuberant. A surgical scar is present. Bowel sounds are normal. There is no distension. Palpations: Abdomen is soft. Tenderness: There is abdominal tenderness in the right upper quadrant, epigastric area and left upper quadrant. There is guarding. There is no right CVA tenderness, left CVA tenderness or rebound. Positive signs include Theodore's sign. Negative signs include Rovsing's sign, McBurney's sign, psoas sign and obturator sign. Hernia: No hernia is present. Musculoskeletal:         General: Normal range of motion. Cervical back: Normal range of motion and neck supple. Skin:     General: Skin is warm. Capillary Refill: Capillary refill takes less than 2 seconds. Coloration: Skin is not pale. Neurological:      General: No focal deficit present. Mental Status: He is alert and oriented to person, place, and time. GCS: GCS eye subscore is 4. GCS verbal subscore is 5. GCS motor subscore is 6. Psychiatric:         Mood and Affect: Mood normal.         Behavior: Behavior normal.         Thought Content:  Thought content normal.         DIFFERENTIAL DIAGNOSIS:   Differential diagnoses are discussed    DIAGNOSTIC RESULTS     EKG:(none if blank)  All EKGs are interpreted by the Emergency Department Physician who either signs or Co-signs this chart in the absence of a cardiologist.     Ref Range & Units 4/6/22 1241   Ventricular Rate BPM 81 Atrial Rate BPM 81    P-R Interval ms 160    QRS Duration ms 88    Q-T Interval ms 360    QTc Calculation (Bazett) ms 418    P Axis degrees 38    R Axis degrees 71    T Axis degrees 69    [facility]ing [facility]             Narrative & Impression    Normal sinus rhythm  Nonspecific T wave abnormality  Abnormal ECG  When compared with ECG of 08-APR-2004 12:20,  Non-specific change in ST segment in Anterior leads  Nonspecific T wave abnormality no longer evident in Inferior leads  Nonspecific T wave abnormality is worse in Anterolateral leads  Confirmed by Lindsey Irvin MD, Jillian Ford ([id number]) on 4/6/2022 8:38:31 PM                 RADIOLOGY: (none if blank)   I directly visualized the following images and reviewed the radiologist interpretations. Interpretation per the Radiologist below, if available at the time of this note:  MRI ABDOMEN W WO CONTRAST MRCP   Final Result   1. Tiny biliary calculi are seen in the common bile duct relating to choledocholithiasis. 2. Marked gallbladder distention with gallstones and sludge with pericholecystic fluid. Findings are suspicious for acute cholecystitis. 3. Marked hepatomegaly. Hepatic steatosis. 4. Other findings as described above. **This report has been created using voice recognition software. It may contain minor errors which are inherent in voice recognition technology. **      Final report electronically signed by Dr Nicole Rai on 4/7/2022 11:20 AM      [address] [place] [address]   Final Result   1. Marked gallbladder distention with pericholecystic fluid, gallbladder debris and sludge. Findings likely reflect acute cholecystitis. 2. Hepatomegaly. Hepatic steatosis. **This report has been created using voice recognition software. It may contain minor errors which are inherent in voice recognition technology. **      Final report electronically signed by Dr Nicole Rai on 4/6/2022 4:19 PM      CT ABDOMEN PELVIS W IV CONTRAST Additional Contrast? None   Final Result   1. Marked gallbladder distention with high density sludge/stones layering within the gallbladder and mild pericholecystic edema suggests acute cholecystitis in the appropriate clinical setting. No biliary ductal dilatation is observed. Correlate with    liver function tests. 2. Normal appendix. Colonic diverticulosis without diverticulitis. Moderate retained fecal material seen throughout the colon suggesting fecal stasis. No bowel obstruction. 3. Chronic findings are discussed. **This report has been created using voice recognition software. It may contain minor errors which are inherent in voice recognition technology. **      Final report electronically signed by Dr Candida Esposito on 4/6/2022 2:19 PM          LABS:   Labs Reviewed   CBC WITH AUTO DIFFERENTIAL - Abnormal; Notable for the following components:       Result Value    WBC 11.3 (*)     RBC 6.79 (*)     Hemoglobin 19.7 (*)     Hematocrit 61.0 (*)     RDW-CV 14.9 (*)     RDW-SD 45.5 (*)     Segs Absolute 9.4 (*)     Immature Grans (Abs) 0.10 (*)     All other components within normal limits   BASIC METABOLIC PANEL W/ REFLEX TO MG FOR LOW K - Abnormal; Notable for the following components:    Chloride 95 (*)     Glucose 259 (*)     All other components within normal limits   HEPATIC FUNCTION PANEL - Abnormal; Notable for the following components:     Total Bilirubin 2.9 (*)     Bilirubin, Direct 1.9 (*)     Alkaline Phosphatase 191 (*)      (*)      (*)     Total Protein 8.7 (*)     All other components within normal limits   ANION GAP - Abnormal; Notable for the following components:    Anion Gap 21.0 (*)     All other components within normal limits   URINE WITH REFLEXED MICRO - Abnormal; Notable for the following components:    Glucose, Ur >= 1000 (*)     Ketones, Urine 40 (*)     Specific Gravity, Urine > 1.030 (*)     Protein, UA TRACE (*)     All other components within normal limits   BLOOD GAS, VENOUS - Abnormal; Notable for the following components:    PO2, Mixed 52 (*)     All other components within normal limits   PROCALCITONIN - Abnormal; Notable for the following components:    Procalcitonin 0.13 (*)     All other components within normal limits   LACTIC ACID - Abnormal; Notable for the following components:    Lactic Acid 2.1 (*)     All other components within normal limits   HEMOGLOBIN A1C - Abnormal; Notable for the following components:    Hemoglobin A1C 7.7 (*)     AVERAGE GLUCOSE 171 (*)     All other components within normal limits   COMPREHENSIVE METABOLIC PANEL W/ REFLEX TO MG FOR LOW K - Abnormal; Notable for the following components:    Glucose 154 (*)     CO2 22 (*)      (*)     Alkaline Phosphatase 198 (*)     Total Bilirubin 5.1 (*)      (*)     All other components within normal limits   CBC WITH AUTO DIFFERENTIAL - Abnormal; Notable for the following components:    Hematocrit 54.1 (*)     MCHC 32.0 (*)     RDW-CV 14.6 (*)     RDW-SD 47.9 (*)     All other components within normal limits   HEPATIC FUNCTION PANEL - Abnormal; Notable for the following components:    Bilirubin, Direct 4.4 (*)     All other components within normal limits   POCT GLUCOSE - Abnormal; Notable for the following components:    POC Glucose 161 (*)     All other components within normal limits   POCT GLUCOSE - Abnormal; Notable for the following components:    POC Glucose 142 (*)     All other components within normal limits   POCT GLUCOSE - Abnormal; Notable for the following components:    POC Glucose 172 (*)     All other components within normal limits   POCT GLUCOSE - Abnormal; Notable for the following components:    POC Glucose 179 (*)     All other components within normal limits   LIPASE   SCAN OF BLOOD SMEAR   GLOMERULAR FILTRATION RATE, ESTIMATED   OSMOLALITY   TROPONIN   LIPASE   BASIC METABOLIC PANEL   ANION GAP   GLOMERULAR FILTRATION RATE, ESTIMATED COMPREHENSIVE METABOLIC PANEL W/ REFLEX TO MG FOR LOW K   POCT GLUCOSE   POCT GLUCOSE   POCT GLUCOSE       All other labs were within normal range or not returned as of this dictation. Please note, any cultures that may have been sent were not resulted at the time of this patient visit. EMERGENCY DEPARTMENT COURSE:   Vitals:    Vitals:    04/07/22 0349 04/07/22 0830 04/07/22 1130 04/07/22 1615   BP:  120/66 110/68 111/76   Pulse:  90 95 81   Resp: 16 16 16 16   Temp:  98.5 °F (36.9 °C) 98.2 °F (36.8 °C) 98.4 °F (36.9 °C)   TempSrc:  Oral Oral Oral   SpO2:  94% 92% 97%   Weight:       Height:         12:44 PM EDT: The patient was seen and evaluated.     PROCEDURES: (None if blank)  Procedures         ED Medications administered this visit:    Medications   piperacillin-tazobactam (ZOSYN) 3,375 mg in dextrose 5 % 50 mL IVPB extended infusion (mini-bag) (3,375 mg IntraVENous New Bag 4/7/22 1521)   insulin lispro (HUMALOG) injection vial 0-6 Units (1 Units SubCUTAneous Given 4/7/22 1528)   glucagon (rDNA) injection 1 mg (has no administration in time range)   dextrose 5 % solution (has no administration in time range)   0.9 % sodium chloride bolus (1,000 mLs IntraVENous Not Given 4/6/22 1753)   glucose chewable tablet 4 each (has no administration in time range)   dextrose bolus (hypoglycemia) 10% 125 mL (has no administration in time range)     Or   dextrose bolus (hypoglycemia) 10% 250 mL (has no administration in time range)   albuterol (PROVENTIL) nebulizer solution 5 mg (has no administration in time range)   atorvastatin (LIPITOR) tablet 40 mg ( Oral Automatically Held 4/12/22 0900)   DULoxetine (CYMBALTA) extended release capsule 30 mg (30 mg Oral Given 4/7/22 0842)   HYDROcodone-acetaminophen (NORCO)  MG per tablet 1 tablet ( Oral Automatically Held 4/12/22 2100)   metoprolol tartrate (LOPRESSOR) tablet 25 mg (25 mg Oral Given 4/7/22 0842)   montelukast (SINGULAIR) tablet 10 mg (10 mg Oral Given 4/6/22 2055)   morphine (MS CONTIN) extended release tablet 30 mg (30 mg Oral Given 4/7/22 1454)   pantoprazole (PROTONIX) injection 40 mg (40 mg IntraVENous Given 4/7/22 0914)   pregabalin (LYRICA) capsule 200 mg (200 mg Oral Given 4/7/22 1454)   risperiDONE (RISPERDAL) tablet 1 mg (1 mg Oral Given 4/7/22 0841)   alogliptin (NESINA) tablet 12.5 mg ( Oral Automatically Held 4/12/22 0900)   tamsulosin (FLOMAX) capsule 0.4 mg (0.4 mg Oral Given 4/6/22 2055)   sodium chloride flush 0.9 % injection 5-40 mL (5 mLs IntraVENous Not Given 4/7/22 0837)   sodium chloride flush 0.9 % injection 5-40 mL (has no administration in time range)   0.9 % sodium chloride infusion ( IntraVENous New Bag 4/7/22 1521)   polyethylene glycol (GLYCOLAX) packet 17 g (has no administration in time range)   acetaminophen (TYLENOL) tablet 650 mg (has no administration in time range)     Or   acetaminophen (TYLENOL) suppository 650 mg (has no administration in time range)   lactated ringers infusion ( IntraVENous Rate/Dose Change 4/6/22 2057)   HYDROmorphone (DILAUDID) injection 1 mg (1 mg IntraVENous Given 4/7/22 0496)     Or   HYDROmorphone (DILAUDID) injection 1.5 mg ( IntraVENous See Alternative 4/7/22 0648)   potassium chloride (KLOR-CON M) extended release tablet 40 mEq (has no administration in time range)     Or   potassium bicarb-citric acid (EFFER-K) effervescent tablet 40 mEq (has no administration in time range)     Or   potassium chloride 10 mEq/100 mL IVPB (Peripheral Line) (has no administration in time range)   magnesium sulfate 2000 mg in 50 mL IVPB premix (has no administration in time range)   bisacodyl (DULCOLAX) suppository 10 mg (has no administration in time range)   enoxaparin (LOVENOX) injection 40 mg ( SubCUTAneous Automatically Held 4/12/22 2100)   mometasone-formoterol (DULERA) 200-5 MCG/ACT inhaler 2 puff (2 puffs Inhalation Not Given 4/7/22 4692)   0.9 % sodium chloride bolus (1,000 mLs IntraVENous New Bag 4/6/22 1344)   iopamidol (ISOVUE-370) 76 % injection 80 mL (80 mLs IntraVENous Given 4/6/22 1402)   gadoteridol (PROHANCE) injection 20 mL (20 mLs IntraVENous Given 4/7/22 1052)       MDM:  Patient is 58-year-old male who came to the ED to be evaluated for right upper quadrant pain. Appropriate testing/imaging of EKG, CBC, BMP, hepatic, lipase, troponin, urinalysis was done based on the patient's initial complaints, history, and physical exam.   Results of lipase, EKG, troponin demonstrated no emergent findings. Pertinent results were elevations in LFTs, bilirubin, alk phos. Also noted elevations in glucose, anion gap 21, WBC 11.3. Recommended CT abdomen/pelvis with IV contrast to rule out cholecystitis versus cholangitis versus other. CT demonstrated distended gallbladder with noted sludge versus gallbladder stones. General surgery Dr. Cody Hays was consulted who recommended admission under hospitalist with patient's medical history of diabetes, COPD, heart disease, pain management. General surgery recommends possible cholecystectomy and will need hospital clearance. Discussed case with hospitalist who accepted admission. Patient was noted to be stable upon admission to hospital.       CRITICAL CARE:   None    CONSULTS:  General surgery, hospitalist    PROCEDURES:  None    FINAL IMPRESSION      1. Acute cholecystitis          DISPOSITION/PLAN   Admission under hospitalist    PATIENT REFERRED TO:  No follow-up provider specified.     DISCHARGEMEDICATIONS:  Current Discharge Medication List               (Please note that portions of this note were completed with a voice recognition program.  Efforts were made to edit the dictations but occasionallywords are mis-transcribed.)      Sarah Espana PA-C(electronically signed)  Physician Associate, Emergency Department         Sarah Espana PA-C  04/07/22 0524

## 2022-04-06 NOTE — CONSULTS
[address] Surgery Consultation - Melanie Oliver MD      Pt Name: Rebecca Bernal  MRN: [id number]  YOB: 1965  Date of evaluation: 4/6/2022  Primary Care Physician: No primary care provider on file. Patient evaluated at the request of  PATIENCE Diego  Reason for evaluation: Abdominal pain cholecystitis  IMPRESSIONS:   1. Calculus cholecystitis  2. Elevated liver function test secondary to #1  3. Noninsulin-dependent diabetes mellitus with hyperglycemia  4. Polycythemia with probable component of dehydration  5. Reactive gastroparesis with distended stomach  6. History of coronary artery disease with minimal cardiac follow-up in recent years. CABG times 4/2011  7. Asthma  8. Obesity BMI 41  9. Chronic pain with multiple back surgeries chronic disability on chronic narcotic medications  10. Diverticulosis without diverticulitis  11. History of GERD  12.  has no past medical history on file. RECOMMENDATIONS:   1. Patient needs medical evaluation and discussed with Dr. Alvarez who is getting cardiology involved. Will need cardiac evaluation prior to surgical intervention and assessment of stability to undergo general anesthesia for cholecystectomy. 2. Discussed with patient briefly laparoscopic and I feel given the marked distention of his gallbladder has high risk for conversion to an open procedure as well. 3. Patient expressed concerned about his pain management. Okay from surgical standpoint to continue home medications and IV medications he takes 90 morphine equivalents daily as well as hydrocodone. 4. Okay for chemical VTE prophylaxis from a surgical standpoint at this point. Recommend mechanical prophylaxis with SCDs at this point as well. 5. Pulmonary hygiene  6. IV hydration and broad-spectrum IV antibiotics  7. Diabetes management and medical management and evaluation per hospitalist service  8.  Patient tentatively scheduled for surgery on Friday pending medical stability and cardiac risk assessment. SUBJECTIVE:   History of Chief Complaint:    Chikis Ramirez is a 61 y. o.male who presents with abdominal pain. He reports at least a week of upper abdominal discomfort with radiation to the right side of the abdomen. He did not present earlier as he had pain for about 3 days and then he thought he got better but got worse again and he presented to the emergency department for evaluation. He states he has had some darker urine but none today. He has no prior history of peptic ulcer disease, pancreatitis or hepatitis. He did not know he had gallstones. He denies alcohol abuse. He has a history of chronic pain with 3 back surgeries and some radicular pain down his right leg. He takes oral morphine, hydrocodone as well as Lyrica. Evaluation here included a CT scan of the abdomen and pelvis revealed a markedly distended gallbladder with high density sludge and stones layering in some. Cholecystic edema the wall did not really look thickened. There is no obvious biliary duct dilation. Initial lipase was normal.  Total bilirubin was elevated at 2.9 with a direct fraction of 1.9. AST and ALT were 159 and 165 respectively. Alkaline phosphatase was 191. Procalcitonin is pending. Lactic acid was minimally elevated at 2.1. Troponin was negative. There was some mild anterolateral ST changes on EKG. patient reports a history of GERD. Past Medical History   has no past medical history on file. Past Surgical History   has no past surgical history on file. Medications  Prior to Admission medications    Medication Sig Start Date End Date Taking?  Authorizing Provider   fluticasone-umeclidin-vilant (TRELEGY ELLIPTA) 100-62.5-25 MCG/INH AEPB Inhale 1 puff into the lungs daily   Yes Historical Provider, MD   mometasone-formoterol (DULERA) 200-5 MCG/ACT inhaler Inhale 2 puffs into the lungs every 12 hours   Yes Historical Provider, MD   fluticasone (FLONASE) 50 MCG/ACT nasal spray 1 spray by Each Nostril route daily   Yes Historical Provider, MD   Dulaglutide (TRULICITY SC) Inject into the skin once a week   Yes Historical Provider, MD   glimepiride (AMARYL) 4 MG tablet Take 4 mg by mouth every morning (before breakfast)   Yes Historical Provider, MD   risperiDONE (RISPERDAL) 1 MG tablet Take 1 mg by mouth daily   Yes Historical Provider, MD   DULoxetine (CYMBALTA) 30 MG extended release capsule Take 30 mg by mouth daily   Yes Historical Provider, MD   HYDROcodone-acetaminophen (NORCO)  MG per tablet Take 1 tablet by mouth in the morning, at noon, and at bedtime. Yes Historical Provider, MD   pregabalin (LYRICA) 200 MG capsule Take 200 mg by mouth in the morning, at noon, and at bedtime. Yes Historical Provider, MD   morphine (MSIR) 30 MG tablet Take 30 mg by mouth in the morning, at noon, and at bedtime. Yes Historical Provider, MD   montelukast (SINGULAIR) 10 MG tablet Take 10 mg by mouth nightly   Yes Historical Provider, MD   tamsulosin (FLOMAX) 0.4 MG capsule Take 0.4 mg by mouth daily   Yes Historical Provider, MD   atorvastatin (LIPITOR) 40 MG tablet Take 40 mg by mouth daily   Yes Historical Provider, MD   pantoprazole (PROTONIX) 40 MG tablet Take 40 mg by mouth daily   Yes Historical Provider, MD   dapagliflozin (FARXIGA) 10 MG tablet Take 10 mg by mouth every morning   Yes Historical Provider, MD   bisoprolol (ZEBETA) 5 MG tablet Take 5 mg by mouth daily   Yes Historical Provider, MD   SITagliptin (JANUVIA) 100 MG tablet Take 100 mg by mouth daily   Yes Historical Provider, MD   metoprolol tartrate (LOPRESSOR) 50 MG tablet Take 50 mg by mouth daily   Yes Historical Provider, MD   LORazepam (ATIVAN) 0.5 MG tablet Take 0.5 mg by mouth daily as needed for Anxiety.    Yes Historical Provider, MD   meloxicam (MOBIC) 15 MG tablet Take 15 mg by mouth daily   Yes Historical Provider, MD   ketoconazole (NIZORAL) 2 % shampoo Apply topically daily as needed for Itching Apply topically daily as needed. Yes Historical Provider, MD   desonide (DESOWEN) 0.05 % cream Apply topically 2 times daily Apply topically 2 times daily. Yes Historical Provider, MD   ipratropium (ATROVENT) 0.03 % nasal spray 2 sprays by Each Nostril route daily   Yes Historical Provider, MD    Scheduled Meds:   piperacillin-tazobactam (ZOSYN) 3375 mg in dextrose 5% IVPB extended infusion (mini-bag)  3,375 mg IntraVENous Q8H    insulin lispro  0-6 Units SubCUTAneous Q6H    sodium chloride  1,000 mL IntraVENous Once     Continuous Infusions:   dextrose       PRN Meds:.ondansetron, HYDROmorphone, glucagon (rDNA), dextrose, glucose, dextrose bolus (hypoglycemia) **OR** dextrose bolus (hypoglycemia)  Allergies  is allergic to abilify [aripiprazole] and metformin and related. Family History  family history is not on file. Social History   reports that he has never smoked. He has never used smokeless tobacco. He reports current alcohol use. He reports that he does not use drugs. Review of Systems  General Denies any fever or chills  HEENT Denies any diplopia, tinnitus or vertigo  Resp Denies any shortness of breath, cough or wheezing  Cardiac Denies any chest pain, palpitations, claudication or edema  GI GERD  Denies any melena, hematochezia, hematemesis or pyrosis   Denies any frequency, urgency, hesitancy or incontinence  Heme Denies bruising or bleeding easily  Endocrine niddm  Neuro chronic back pain radicular pain right leg  Denies any focal motor or sensory deficits  SUBJECTIVE:   CURRENT VITALS:  height is 5' 8\" (1.727 m) and weight is 270 lb (122.5 kg). His oral temperature is 98.6 °F (37 °C). His blood pressure is 157/88 (abnormal) and his pulse is 84. His respiration is 18 and oxygen saturation is 98%. Body mass index is 41.05 kg/m².   Temperature Range (24h):Temp: 98.6 °F (37 °C) Temp  Av.6 °F (37 °C)  Min: 98.6 °F (37 °C)  Max: 98.6 °F (37 °C)  BP Range (47I): Systolic (15SOF), GYZ:021 , Min:156 , Max:157 Diastolic (33IBZ), GDC:50, Min:88, Max:99    Pulse Range (24h): Pulse  Av.5  Min: 84  Max: 89  Respiration Range (24h): Resp  Av  Min: 18  Max: 18  Current Pulse Ox (24h):  SpO2: 98 %  Pulse Ox Range (24h):  SpO2  Av.5 %  Min: 95 %  Max: 98 %  Oxygen Amount and Delivery: O2 Flow Rate (L/min): 2 L/min  CONSTITUTIONAL: Alert and oriented times 3, no acute distress and cooperative to examination with proper mood and affect. SKIN: Skin color, texture, turgor normal. No rashes or lesions. LYMPH: no cervical nodes  HEENT: Head is normocephalic, atraumatic. EOMI, PERRLA. NECK: supple, symmetrical, trachea midline. CHEST/LUNGS: normal respiratory rate and rhythm, lungs clear to auscultation without wheezes, rales or rhonchi. No accessory muscle use. Scars Median sternotomy   CARDIOVASCULAR: Heart regular rate and rhythm   ABDOMEN:soft tender epigastrium and right upper quadrant, no apparent   RECTAL:deferred  NEUROLOGIC:alert and oriented  EXTREMITIES: No edema no deformity no cyanosis  LABS:     Recent Labs     22  1240 22  1325 22  1524   WBC 11.3*  --   --    HGB 19.7*  --   --    HCT 61.0*  --   --      --   --      --   --    K 3.8  --   --    CL 95*  --   --    CO2 24  --   --    BUN 9  --   --    CREATININE 0.8  --   --    CALCIUM 10.3  --   --    *  --   --    *  --   --    BILITOT 2.9*  --   --    BILIDIR 1.9*  --   --    LIPASE 44.4  --   --    LACTA  --   --  2.1*   NITRU  --  NEGATIVE  --    COLORU  --  YELLOW  --    BACTERIA  --  NONE SEEN  --      RADIOLOGY:     CT ABDOMEN PELVIS W IV CONTRAST Additional Contrast? None   Final Result   1. Marked gallbladder distention with high density sludge/stones layering within the gallbladder and mild pericholecystic edema suggests acute cholecystitis in the appropriate clinical setting. No biliary ductal dilatation is observed. Correlate with    liver function tests. 2. Normal appendix.  Colonic diverticulosis without diverticulitis. Moderate retained fecal material seen throughout the colon suggesting fecal stasis. No bowel obstruction. 3. Chronic findings are discussed. **This report has been created using voice recognition software. It may contain minor errors which are inherent in voice recognition technology. **      Final report electronically signed by Dr Jerri Nicolas on 4/6/2022 2:19 PM      [address] [place] [address]    (Results Pending)       Imaging reviewed care coordinated with Dr. Sun Huertas    Electronically signed by Rober Kirby MD on 4/6/2022 at 3:49 PM

## 2022-04-07 ENCOUNTER — APPOINTMENT (OUTPATIENT)
Dept: MRI IMAGING | Age: 57
DRG: 853 | End: 2022-04-07
Payer: MEDICARE

## 2022-04-07 LAB
ALBUMIN SERPL-MCNC: 3.6 G/DL (ref 3.5–5.1)
ALP BLD-CCNC: 198 U/L (ref 38–126)
ALT SERPL-CCNC: 320 U/L (ref 11–66)
ANION GAP SERPL CALCULATED.3IONS-SCNC: 14 MEQ/L (ref 8–16)
AST SERPL-CCNC: 261 U/L (ref 5–40)
BASOPHILS # BLD: 0.4 %
BASOPHILS ABSOLUTE: 0 THOU/MM3 (ref 0–0.1)
BILIRUB SERPL-MCNC: 5.1 MG/DL (ref 0.3–1.2)
BILIRUBIN DIRECT: 4.4 MG/DL (ref 0–0.3)
BUN BLDV-MCNC: 9 MG/DL (ref 7–22)
CALCIUM SERPL-MCNC: 9.1 MG/DL (ref 8.5–10.5)
CHLORIDE BLD-SCNC: 103 MEQ/L (ref 98–111)
CO2: 22 MEQ/L (ref 23–33)
CREAT SERPL-MCNC: 0.6 MG/DL (ref 0.4–1.2)
EKG ATRIAL RATE: 86 BPM
EKG P AXIS: 51 DEGREES
EKG P-R INTERVAL: 164 MS
EKG Q-T INTERVAL: 380 MS
EKG QRS DURATION: 96 MS
EKG QTC CALCULATION (BAZETT): 454 MS
EKG R AXIS: 66 DEGREES
EKG T AXIS: 66 DEGREES
EKG VENTRICULAR RATE: 86 BPM
EOSINOPHIL # BLD: 0.2 %
EOSINOPHILS ABSOLUTE: 0 THOU/MM3 (ref 0–0.4)
ERYTHROCYTE [DISTWIDTH] IN BLOOD BY AUTOMATED COUNT: 14.6 % (ref 11.5–14.5)
ERYTHROCYTE [DISTWIDTH] IN BLOOD BY AUTOMATED COUNT: 47.9 FL (ref 35–45)
GFR SERPL CREATININE-BSD FRML MDRD: > 90 ML/MIN/1.73M2
GLUCOSE BLD-MCNC: 140 MG/DL (ref 70–108)
GLUCOSE BLD-MCNC: 142 MG/DL (ref 70–108)
GLUCOSE BLD-MCNC: 154 MG/DL (ref 70–108)
GLUCOSE BLD-MCNC: 172 MG/DL (ref 70–108)
GLUCOSE BLD-MCNC: 179 MG/DL (ref 70–108)
HCT VFR BLD CALC: 54.1 % (ref 42–52)
HEMOGLOBIN: 17.3 GM/DL (ref 14–18)
IMMATURE GRANS (ABS): 0.05 THOU/MM3 (ref 0–0.07)
IMMATURE GRANULOCYTES: 0.6 %
LV EF: 50 %
LVEF MODALITY: NORMAL
LYMPHOCYTES # BLD: 16.1 %
LYMPHOCYTES ABSOLUTE: 1.3 THOU/MM3 (ref 1–4.8)
MCH RBC QN AUTO: 29.1 PG (ref 26–33)
MCHC RBC AUTO-ENTMCNC: 32 GM/DL (ref 32.2–35.5)
MCV RBC AUTO: 90.9 FL (ref 80–94)
MONOCYTES # BLD: 13.5 %
MONOCYTES ABSOLUTE: 1.1 THOU/MM3 (ref 0.4–1.3)
NUCLEATED RED BLOOD CELLS: 0 /100 WBC
PLATELET # BLD: 149 THOU/MM3 (ref 130–400)
PMV BLD AUTO: 10.6 FL (ref 9.4–12.4)
POTASSIUM REFLEX MAGNESIUM: 4.1 MEQ/L (ref 3.5–5.2)
POTASSIUM SERPL-SCNC: 4.1 MEQ/L (ref 3.5–5.2)
RBC # BLD: 5.95 MILL/MM3 (ref 4.7–6.1)
SEG NEUTROPHILS: 69.2 %
SEGMENTED NEUTROPHILS ABSOLUTE COUNT: 5.6 THOU/MM3 (ref 1.8–7.7)
SODIUM BLD-SCNC: 139 MEQ/L (ref 135–145)
TOTAL PROTEIN: 6.9 G/DL (ref 6.1–8)
WBC # BLD: 8.1 THOU/MM3 (ref 4.8–10.8)

## 2022-04-07 PROCEDURE — 94760 N-INVAS EAR/PLS OXIMETRY 1: CPT

## 2022-04-07 PROCEDURE — 6370000000 HC RX 637 (ALT 250 FOR IP): Performed by: INTERNAL MEDICINE

## 2022-04-07 PROCEDURE — 74183 MRI ABD W/O CNTR FLWD CNTR: CPT

## 2022-04-07 PROCEDURE — 2580000003 HC RX 258: Performed by: PHYSICIAN ASSISTANT

## 2022-04-07 PROCEDURE — 82248 BILIRUBIN DIRECT: CPT

## 2022-04-07 PROCEDURE — 85025 COMPLETE CBC W/AUTO DIFF WBC: CPT

## 2022-04-07 PROCEDURE — 99233 SBSQ HOSP IP/OBS HIGH 50: CPT | Performed by: SURGERY

## 2022-04-07 PROCEDURE — 93005 ELECTROCARDIOGRAM TRACING: CPT | Performed by: INTERNAL MEDICINE

## 2022-04-07 PROCEDURE — 80053 COMPREHEN METABOLIC PANEL: CPT

## 2022-04-07 PROCEDURE — 93306 TTE W/DOPPLER COMPLETE: CPT

## 2022-04-07 PROCEDURE — 1200000003 HC TELEMETRY R&B

## 2022-04-07 PROCEDURE — 6360000004 HC RX CONTRAST MEDICATION: Performed by: SURGERY

## 2022-04-07 PROCEDURE — C9113 INJ PANTOPRAZOLE SODIUM, VIA: HCPCS | Performed by: INTERNAL MEDICINE

## 2022-04-07 PROCEDURE — 99223 1ST HOSP IP/OBS HIGH 75: CPT | Performed by: INTERNAL MEDICINE

## 2022-04-07 PROCEDURE — 2700000000 HC OXYGEN THERAPY PER DAY

## 2022-04-07 PROCEDURE — A9579 GAD-BASE MR CONTRAST NOS,1ML: HCPCS | Performed by: SURGERY

## 2022-04-07 PROCEDURE — 6360000002 HC RX W HCPCS: Performed by: INTERNAL MEDICINE

## 2022-04-07 PROCEDURE — 99233 SBSQ HOSP IP/OBS HIGH 50: CPT | Performed by: INTERNAL MEDICINE

## 2022-04-07 PROCEDURE — 82948 REAGENT STRIP/BLOOD GLUCOSE: CPT

## 2022-04-07 PROCEDURE — 36415 COLL VENOUS BLD VENIPUNCTURE: CPT

## 2022-04-07 PROCEDURE — 93010 ELECTROCARDIOGRAM REPORT: CPT | Performed by: INTERNAL MEDICINE

## 2022-04-07 PROCEDURE — 2580000003 HC RX 258: Performed by: INTERNAL MEDICINE

## 2022-04-07 RX ORDER — SODIUM CHLORIDE 9 MG/ML
INJECTION, SOLUTION INTRAVENOUS CONTINUOUS
Status: DISCONTINUED | OUTPATIENT
Start: 2022-04-07 | End: 2022-04-09 | Stop reason: ALTCHOICE

## 2022-04-07 RX ORDER — METOPROLOL SUCCINATE 50 MG/1
50 TABLET, EXTENDED RELEASE ORAL DAILY
COMMUNITY
End: 2022-05-26

## 2022-04-07 RX ADMIN — MORPHINE SULFATE 30 MG: 30 TABLET, FILM COATED, EXTENDED RELEASE ORAL at 21:49

## 2022-04-07 RX ADMIN — MONTELUKAST SODIUM 10 MG: 10 TABLET, FILM COATED ORAL at 20:59

## 2022-04-07 RX ADMIN — METOPROLOL TARTRATE 25 MG: 50 TABLET, FILM COATED ORAL at 08:42

## 2022-04-07 RX ADMIN — INSULIN LISPRO 1 UNITS: 100 INJECTION, SOLUTION INTRAVENOUS; SUBCUTANEOUS at 15:28

## 2022-04-07 RX ADMIN — PIPERACILLIN AND TAZOBACTAM 3375 MG: 3; .375 INJECTION, POWDER, LYOPHILIZED, FOR SOLUTION INTRAVENOUS at 09:17

## 2022-04-07 RX ADMIN — PREGABALIN 200 MG: 75 CAPSULE ORAL at 14:54

## 2022-04-07 RX ADMIN — SODIUM CHLORIDE: 9 INJECTION, SOLUTION INTRAVENOUS at 21:12

## 2022-04-07 RX ADMIN — HYDROMORPHONE HYDROCHLORIDE 1 MG: 1 INJECTION, SOLUTION INTRAMUSCULAR; INTRAVENOUS; SUBCUTANEOUS at 03:19

## 2022-04-07 RX ADMIN — HYDROMORPHONE HYDROCHLORIDE 1 MG: 1 INJECTION, SOLUTION INTRAMUSCULAR; INTRAVENOUS; SUBCUTANEOUS at 06:48

## 2022-04-07 RX ADMIN — MORPHINE SULFATE 30 MG: 30 TABLET, FILM COATED, EXTENDED RELEASE ORAL at 08:41

## 2022-04-07 RX ADMIN — DULOXETINE HYDROCHLORIDE 30 MG: 30 CAPSULE, DELAYED RELEASE ORAL at 08:42

## 2022-04-07 RX ADMIN — PIPERACILLIN AND TAZOBACTAM 3375 MG: 3; .375 INJECTION, POWDER, LYOPHILIZED, FOR SOLUTION INTRAVENOUS at 15:21

## 2022-04-07 RX ADMIN — PREGABALIN 200 MG: 75 CAPSULE ORAL at 08:42

## 2022-04-07 RX ADMIN — PANTOPRAZOLE SODIUM 40 MG: 40 INJECTION, POWDER, FOR SOLUTION INTRAVENOUS at 09:14

## 2022-04-07 RX ADMIN — HYDROMORPHONE HYDROCHLORIDE 1 MG: 1 INJECTION, SOLUTION INTRAMUSCULAR; INTRAVENOUS; SUBCUTANEOUS at 18:31

## 2022-04-07 RX ADMIN — SODIUM CHLORIDE, PRESERVATIVE FREE 10 ML: 5 INJECTION INTRAVENOUS at 20:59

## 2022-04-07 RX ADMIN — TAMSULOSIN HYDROCHLORIDE 0.4 MG: 0.4 CAPSULE ORAL at 20:59

## 2022-04-07 RX ADMIN — PREGABALIN 200 MG: 75 CAPSULE ORAL at 21:03

## 2022-04-07 RX ADMIN — INSULIN LISPRO 1 UNITS: 100 INJECTION, SOLUTION INTRAVENOUS; SUBCUTANEOUS at 21:49

## 2022-04-07 RX ADMIN — METOPROLOL TARTRATE 25 MG: 50 TABLET, FILM COATED ORAL at 20:59

## 2022-04-07 RX ADMIN — MORPHINE SULFATE 30 MG: 30 TABLET, FILM COATED, EXTENDED RELEASE ORAL at 14:54

## 2022-04-07 RX ADMIN — INSULIN LISPRO 1 UNITS: 100 INJECTION, SOLUTION INTRAVENOUS; SUBCUTANEOUS at 03:29

## 2022-04-07 RX ADMIN — RISPERIDONE 1 MG: 1 TABLET ORAL at 08:41

## 2022-04-07 RX ADMIN — SODIUM CHLORIDE: 9 INJECTION, SOLUTION INTRAVENOUS at 15:21

## 2022-04-07 RX ADMIN — INSULIN LISPRO 1 UNITS: 100 INJECTION, SOLUTION INTRAVENOUS; SUBCUTANEOUS at 08:49

## 2022-04-07 RX ADMIN — PIPERACILLIN AND TAZOBACTAM 3375 MG: 3; .375 INJECTION, POWDER, LYOPHILIZED, FOR SOLUTION INTRAVENOUS at 00:22

## 2022-04-07 RX ADMIN — PIPERACILLIN AND TAZOBACTAM 3375 MG: 3; .375 INJECTION, POWDER, LYOPHILIZED, FOR SOLUTION INTRAVENOUS at 23:30

## 2022-04-07 RX ADMIN — GADOTERIDOL 20 ML: 279.3 INJECTION, SOLUTION INTRAVENOUS at 10:52

## 2022-04-07 RX ADMIN — HYDROMORPHONE HYDROCHLORIDE 1 MG: 1 INJECTION, SOLUTION INTRAMUSCULAR; INTRAVENOUS; SUBCUTANEOUS at 00:19

## 2022-04-07 ASSESSMENT — PAIN SCALES - GENERAL
PAINLEVEL_OUTOF10: 3
PAINLEVEL_OUTOF10: 4
PAINLEVEL_OUTOF10: 6
PAINLEVEL_OUTOF10: 4
PAINLEVEL_OUTOF10: 4
PAINLEVEL_OUTOF10: 5
PAINLEVEL_OUTOF10: 5
PAINLEVEL_OUTOF10: 4
PAINLEVEL_OUTOF10: 7
PAINLEVEL_OUTOF10: 6
PAINLEVEL_OUTOF10: 4
PAINLEVEL_OUTOF10: 3

## 2022-04-07 ASSESSMENT — PAIN DESCRIPTION - LOCATION
LOCATION: ABDOMEN;FLANK
LOCATION: ABDOMEN;FLANK
LOCATION: ABDOMEN
LOCATION: ABDOMEN;FLANK
LOCATION: ABDOMEN
LOCATION: ABDOMEN;FLANK

## 2022-04-07 ASSESSMENT — PAIN DESCRIPTION - DESCRIPTORS
DESCRIPTORS: DISCOMFORT;SHARP
DESCRIPTORS: DISCOMFORT;SHARP
DESCRIPTORS: DISCOMFORT
DESCRIPTORS: SHARP
DESCRIPTORS: SHARP
DESCRIPTORS: DISCOMFORT;SHARP
DESCRIPTORS: DISCOMFORT

## 2022-04-07 ASSESSMENT — PAIN DESCRIPTION - PAIN TYPE
TYPE: ACUTE PAIN
TYPE: ACUTE PAIN;CHRONIC PAIN
TYPE: ACUTE PAIN

## 2022-04-07 ASSESSMENT — PAIN DESCRIPTION - FREQUENCY
FREQUENCY: CONTINUOUS

## 2022-04-07 ASSESSMENT — PAIN DESCRIPTION - ONSET
ONSET: ON-GOING

## 2022-04-07 ASSESSMENT — PAIN DESCRIPTION - PROGRESSION
CLINICAL_PROGRESSION: NOT CHANGED

## 2022-04-07 ASSESSMENT — PAIN - FUNCTIONAL ASSESSMENT
PAIN_FUNCTIONAL_ASSESSMENT: ACTIVITIES ARE NOT PREVENTED
PAIN_FUNCTIONAL_ASSESSMENT: ACTIVITIES ARE NOT PREVENTED
PAIN_FUNCTIONAL_ASSESSMENT: 0-10
PAIN_FUNCTIONAL_ASSESSMENT: ACTIVITIES ARE NOT PREVENTED

## 2022-04-07 ASSESSMENT — PAIN DESCRIPTION - ORIENTATION
ORIENTATION: RIGHT;LEFT;MID
ORIENTATION: UPPER;RIGHT;LEFT
ORIENTATION: MID;RIGHT;LEFT
ORIENTATION: RIGHT;LEFT;UPPER
ORIENTATION: UPPER;RIGHT;LEFT
ORIENTATION: MID
ORIENTATION: MID;LEFT;RIGHT

## 2022-04-07 NOTE — PROGRESS NOTES
Pharmacy Medication History Note      List of current medications patient is taking is complete. Source of information: [employer]    Changes made to medication list:    Medications removed (include reason, ex. therapy complete or physician discontinued):  Metoprolol tartrate (wrong form)  Dulera (duplicate)    Medications added/doses adjusted:  Toprol XL 50mg daily    Other notes (ex. Recent course of antibiotics, Coumadin dosing):  Denies use of other OTC or herbal medications. Allergies reviewed      Electronically signed by Coby Finney.  Dru [facility] on 4/7/2022 at 10:59 AM

## 2022-04-07 NOTE — CONSULTS
[facility]'s  Cardiology Consult        Patient:  Aurora Alcazar  YOB: 1965  MRN: [id number]     Acct: [de-identified]    PCP: No primary care provider on file. Date of Admission: 4/6/2022      Reason for Consultation:  Cardiac clearance for cholecystectomy vs perc drainage       History Of Present Illness:    61 y.o. pleasant male w/ PMHx of asthma, obesity, GEENA with noncompliance, CAD s/p CABG, former tobacco abuse who presented to the hospital with complaints of abdominal pain. Cardiology consulted to evaluate patient for cardiac clearance vs cholecystectomy vs perc drainage for cholecystitis. Pt presented to the hospital with complaints of worsening abdominal pain. He does endorse a cath with CABG in 2011 by Dr. Angelica Pate, though has not followed up with cardiology since 2012/13. He denies any stents. He denies any history of CHF or CVA. He does experience some intermittent shortness of breath in the morning almost daily when first waking up for the day, though resolves throughout the day. Denies any chest pain, palpitations, edema. EKG 4/7/22: NSR   US Abdomen 4/6/22: Gallbladder distension with pericholecystic fluid, gallbladder debris and sludge consistent with acute cholecystitis   Troponin <0.010    All labs, EKG's, diagnostic testing and images as well as cardiac cath, stress testing were reviewed during this encounter. Past Medical History:          Diagnosis Date    CAD (coronary artery disease)     Diabetes mellitus ([facility]     Hyperlipidemia     Hypertension     Movement disorder     Neuromuscular disorder ([facility]     Pneumonia     Psychiatric problem        Past Surgical History:          Procedure Laterality Date    BACK SURGERY      CARDIAC SURGERY      COLONOSCOPY      ENDOSCOPY, COLON, DIAGNOSTIC         Medications Prior to Admission:      Prior to Admission medications    Medication Sig Start Date End Date Taking?  Authorizing Provider fluticasone-umeclidin-vilant (TRELEGY ELLIPTA) 100-62.5-25 MCG/INH AEPB Inhale 1 puff into the lungs daily   Yes Historical Provider, MD   mometasone-formoterol (DULERA) 200-5 MCG/ACT inhaler Inhale 2 puffs into the lungs every 12 hours   Yes Historical Provider, MD   fluticasone (FLONASE) 50 MCG/ACT nasal spray 1 spray by Each Nostril route daily   Yes Historical Provider, MD   Dulaglutide (TRULICITY SC) Inject into the skin once a week Patient takes each Sunday evening   Yes Historical Provider, MD   glimepiride (AMARYL) 4 MG tablet Take 4 mg by mouth every morning (before breakfast)   Yes Historical Provider, MD   risperiDONE (RISPERDAL) 1 MG tablet Take 1 mg by mouth daily   Yes Historical Provider, MD   DULoxetine (CYMBALTA) 30 MG extended release capsule Take 30 mg by mouth daily   Yes Historical Provider, MD   HYDROcodone-acetaminophen (NORCO)  MG per tablet Take 1 tablet by mouth in the morning, at noon, and at bedtime. Yes Historical Provider, MD   pregabalin (LYRICA) 200 MG capsule Take 200 mg by mouth in the morning, at noon, and at bedtime. Yes Historical Provider, MD   montelukast (SINGULAIR) 10 MG tablet Take 10 mg by mouth nightly   Yes Historical Provider, MD   tamsulosin (FLOMAX) 0.4 MG capsule Take 0.4 mg by mouth at bedtime    Yes Historical Provider, MD   atorvastatin (LIPITOR) 40 MG tablet Take 40 mg by mouth nightly    Yes Historical Provider, MD   pantoprazole (PROTONIX) 40 MG tablet Take 40 mg by mouth 2 times daily    Yes Historical Provider, MD   bisoprolol (ZEBETA) 5 MG tablet Take 5 mg by mouth at bedtime    Yes Historical Provider, MD   SITagliptin (JANUVIA) 100 MG tablet Take 100 mg by mouth at bedtime    Yes Historical Provider, MD   metoprolol tartrate (LOPRESSOR) 50 MG tablet Take 50 mg by mouth at bedtime    Yes Historical Provider, MD   LORazepam (ATIVAN) 0.5 MG tablet Take 0.5 mg by mouth at bedtime.     Yes Historical Provider, MD   meloxicam (MOBIC) 15 MG tablet Take 15 mg by mouth daily   Yes Historical Provider, MD   ketoconazole (NIZORAL) 2 % shampoo Apply topically daily as needed for Itching Apply topically daily as needed. Yes Historical Provider, MD   desonide (DESOWEN) 0.05 % cream Apply topically 2 times daily Apply topically 2 times daily. Yes Historical Provider, MD   ipratropium (ATROVENT) 0.03 % nasal spray 2 sprays by Each Nostril route daily   Yes Historical Provider, MD   aspirin 81 MG chewable tablet Take 81 mg by mouth daily   Yes Historical Provider, MD   morphine (MS CONTIN) 30 MG extended release tablet Take 30 mg by mouth in the morning, at noon, and at bedtime.  Morning, noon and bedtime   Yes Historical Provider, MD   dapagliflozin (FARXIGA) 10 MG tablet Take 10 mg by mouth every morning  Patient not taking: Reported on 4/6/2022    Historical Provider, MD       Current Facility-Administered Medications   Medication Dose Route Frequency Provider Last Rate Last Admin    piperacillin-tazobactam (ZOSYN) 3,375 mg in dextrose 5 % 50 mL IVPB extended infusion (mini-bag)  3,375 mg IntraVENous Q8H Bea Giordano, DO   Stopped at 04/07/22 0422    insulin lispro (HUMALOG) injection vial 0-6 Units  0-6 Units SubCUTAneous Q6H Bea Giordano, DO   1 Units at 04/07/22 0329    glucagon (rDNA) injection 1 mg  1 mg IntraMUSCular PRN Bea Giordano, DO        dextrose 5 % solution  100 mL/hr IntraVENous PRN Bea Giordano, DO        0.9 % sodium chloride bolus  1,000 mL IntraVENous Once Bea Giordano DO        glucose chewable tablet 4 each  4 tablet Oral PRN Bea Giordano, DO        dextrose bolus (hypoglycemia) 10% 125 mL  125 mL IntraVENous PRN Bea Giordano, DO        Or    dextrose bolus (hypoglycemia) 10% 250 mL  250 mL IntraVENous PRN Bea Giordano, DO        albuterol (PROVENTIL) nebulizer solution 5 mg  5 mg Nebulization Q4H PRN Bea Giordano, DO        [Held by provider] atorvastatin (LIPITOR) tablet 40 mg  40 mg Oral Daily Bea Giordano, DO        DULoxetine (CYMBALTA) extended release capsule 30 mg  30 mg Oral Daily Pradip Rodríguez DO        [Held by provider] HYDROcodone-acetaminophen (NORCO)  MG per tablet 1 tablet  1 tablet Oral TID Pradip Rodríguez DO        metoprolol tartrate (LOPRESSOR) tablet 25 mg  25 mg Oral BID Pradip Rodríguez, DO   25 mg at 04/06/22 2055    montelukast (SINGULAIR) tablet 10 mg  10 mg Oral Nightly Pradip Rodríguez, DO   10 mg at 04/06/22 2055    morphine (MS CONTIN) extended release tablet 30 mg  30 mg Oral TID Pradip Rodríguez, DO   30 mg at 04/06/22 2055    pantoprazole (PROTONIX) injection 40 mg  40 mg IntraVENous Daily Pradip Rodríguez DO        pregabalin (LYRICA) capsule 200 mg  200 mg Oral TID Pradip Rodríguez, DO   200 mg at 04/06/22 2056    risperiDONE (RISPERDAL) tablet 1 mg  1 mg Oral Daily Pradip Rodríguez DO        [Held by provider] alogliptin (NESINA) tablet 12.5 mg  12.5 mg Oral Daily Pradip Rodríguez,         tamsulosin [facility]) capsule 0.4 mg  0.4 mg Oral Nightly Pradip Rodríguez, DO   0.4 mg at 04/06/22 2055    sodium chloride flush 0.9 % injection 5-40 mL  5-40 mL IntraVENous 2 times per day Pradip Rodríguez, DO   10 mL at 04/06/22 2056    sodium chloride flush 0.9 % injection 5-40 mL  5-40 mL IntraVENous PRN Pradip Rodríguez, DO        0.9 % sodium chloride infusion   IntraVENous PRN Pradip Rodríguez DO        polyethylene glycol (GLYCOLAX) packet 17 g  17 g Oral Daily PRN Pradip Rodríguez DO        acetaminophen (TYLENOL) tablet 650 mg  650 mg Oral Q6H PRN Pradip Rodríguez DO        Or    acetaminophen (TYLENOL) suppository 650 mg  650 mg Rectal Q6H PRN Pradip Rodríguez,         lactated ringers infusion   IntraVENous Continuous Gurmeet Pappas  mL/hr at 04/06/22 2057 Rate Change at 04/06/22 2057    HYDROmorphone (DILAUDID) injection 1 mg  1 mg IntraVENous Q3H PRN Pradip Rodríguez DO   1 mg at 04/07/22 2576    Or    HYDROmorphone (DILAUDID) injection 1.5 mg  1.5 mg IntraVENous Q3H PRN Pradip Rodríguez DO        potassium chloride (KLOR-CON M) extended release tablet 40 mEq  40 mEq Oral PRN Pradip Freiberg, DO        Or    potassium bicarb-citric acid (EFFER-K) effervescent tablet 40 mEq  40 mEq Oral PRN Pradip Freiberg, DO        Or    potassium chloride 10 mEq/100 mL IVPB (Peripheral Line)  10 mEq IntraVENous PRN Pradip Freiberg, DO        magnesium sulfate 2000 mg in 50 mL IVPB premix  2,000 mg IntraVENous PRN Pradip Freiberg, DO        bisacodyl (DULCOLAX) suppository 10 mg  10 mg Rectal Daily PRN Pradip Freiberg, DO        enoxaparin (LOVENOX) injection 40 mg  40 mg SubCUTAneous Q12H Gurmeet Pappas MD   40 mg at 04/06/22 2056       Allergies:  Abilify [aripiprazole] and Metformin and related    Social History:    TOBACCO:   reports that he has never smoked. He has never used smokeless tobacco.  ETOH:   reports current alcohol use. Family History:    History reviewed. No pertinent family history. Review of Systems -   General ROS: negative  Psychological ROS: negative  Hematological and Lymphatic ROS: No history of blood clots or bleeding disorder. Respiratory ROS: no cough, shortness of breath, or wheezing  Cardiovascular ROS: As per HPI  Gastrointestinal ROS: negative  Genito-Urinary ROS: no dysuria, trouble voiding, or hematuria  Musculoskeletal ROS: negative  Neurological ROS: no TIA or stroke symptoms  Dermatological ROS: negative    All others reviewed and are negative.        /66   Pulse 90   Temp 98.5 °F (36.9 °C) (Oral)   Resp 16   Ht 5' 8\" (1.727 m)   Wt 270 lb (122.5 kg)   SpO2 94%   BMI 41.05 kg/m²       Physical Examination:   General appearance - alert, in no distress  Mental status - alert, oriented to person, place, and time  Neck - supple, no significant adenopathy, no JVD, or carotid bruits  Chest - clear to auscultation, no wheezes, rales or rhonchi, symmetric air entry  Heart - normal rate, regular rhythm, normal S1, S2, no murmurs, rubs, clicks or gallops  Abdomen - soft, diffuse tenderness to palpation with increased tenderness in the RUQ, nondistended, no masses or organomegaly  Neurological - alert, oriented, normal speech, no focal findings or movement disorder noted  Musculoskeletal - no joint tenderness, deformity or swelling  Extremities - peripheral pulses normal, no pedal edema, no clubbing or cyanosis  Skin - normal coloration and turgor, no rashes, no suspicious skin lesions noted      LABS:    Recent Labs     04/06/22  1240   TROPONINT < 0.010     CBC:   Lab Results   Component Value Date    WBC 8.1 04/07/2022    RBC 5.95 04/07/2022    HGB 17.3 04/07/2022    HCT 54.1 04/07/2022    MCV 90.9 04/07/2022    MCH 29.1 04/07/2022    MCHC 32.0 04/07/2022     04/07/2022    MPV 10.6 04/07/2022     BMP:    Lab Results   Component Value Date     04/07/2022    K 4.1 04/07/2022    K 4.1 04/07/2022     04/07/2022    CO2 22 04/07/2022    BUN 9 04/07/2022    LABALBU 3.6 04/07/2022    CREATININE 0.6 04/07/2022    CALCIUM 9.1 04/07/2022    LABGLOM >90 04/07/2022    GLUCOSE 154 04/07/2022     Hepatic Function Panel:    Lab Results   Component Value Date    ALKPHOS 198 04/07/2022     04/07/2022     04/07/2022    PROT 6.9 04/07/2022    BILITOT 5.1 04/07/2022    BILIDIR 4.4 04/07/2022    LABALBU 3.6 04/07/2022     Magnesium:  No results found for: MG  Warfarin PT/INR:  No components found for: PTPATWAR, PTINRWAR  HgBA1c:    Lab Results   Component Value Date    LABA1C 7.7 04/06/2022     FLP:  No results found for: TRIG, HDL, LDLCALC, LDLDIRECT, LABVLDL  TSH:  No results found for: TSH  BNP: No components found for: PRO-BNP      Assessment/Plan:    Patient Active Problem List   Diagnosis    Acute cholecystitis     A:  Hx of CAD w/ CABG   Acute Cholecystitis   HLD  HTN  T2DM    P:  No anginal or heart failure symptoms  EKG benign without significant findings  Good functional status with >4 METS  Reviewed Echo, EF 50%, no significant valvular dysfunction  No further testing is required  May proceed with scheduled surgery  from cardiac standpoint Reconsult cardiology as needed    Please do note hesitate to contact me for any further questions. Thank you for the opportunity to be involved in this patient's care.     Code Status: Full Code    Electronically signed by Ronnie Tracey DO on 4/7/2022 at 8:37 AM

## 2022-04-07 NOTE — PROGRESS NOTES
Hospitalist Progress Note    Patient:  Calista Prieto    YOB: 1965  Unit/Bed:7K-27/027-A  MRN: [id number]    Acct: [de-identified]   PCP: Nicholas Lee DO    Date of Admission: 4/6/2022      Assessment/Plan:    · Acute Cholecystitis: ?cholangitis, + cholecystitis but no biliary dilation seen on CT abd/pelv. Lipase wnl, hepatitis as noted below. Suspect patient also passed a stone about a week ago as well based on his symptoms he describes. ? Empiric Zosyn, noted MRCP  ? Cardiology consult for preop eval  ? IVF support  ? D/w Gen Surgery - lap namrata on 4/8 with IOC. GI available if needs arise for ERCP  ? Dilaudid 1/1.5mg PRN (may need higher dose given significant home pain requirements). Bowel regimen is available. · Obstructive Transaminitis (on history of FLD): likely secondary to above. Alk phos 191, , , bilirubin 2.9 with a direct bilirubin of 1.9. There was no biliary ductal dilatation, possible passed stone. ? Trend lfts - worsening on 4/7  ? MRCP noted small stones in CBD, GI consulted - plan is for [facility] by Gen Surgery (no need for ERCP given small size of stones). If obstruction then patient will need ERCP  ? Avoid hepatotoxins  · ? Sepsis: with WBC, near tachycardia secondary to above. See above for management  ? S/p Aggressive fluids. Antibiotics with Zosyn continued. · Elevated anion gap: will check VBG to determine if there is acidosis present. Bicarb on BMP is wnl, but does not rule out a mixed disorder. · PH was wnl. No further concerns. · Steatosis/Hepatomegaly: noted. Advise weight loss. Liver margin palpable in right mid abdomen. · Asthma: no in acute exacerbation. He is on singulair and Trelegy, but takes the inhalers prn.   ? Will start albuterol prn and IS  ? Add on dulera perioperatively for control. ? Recommend PFTs as outpatient  · Abnormal EKG / CAD s/p 4v CABG: CABG in 2013 with Dr. Danette Escalona.   Reports he has not seen a Cardiologist for some time. He takes ASA and two BB (lopressor and bisoprolol) in addition to statin. Initial troponin is negative. ? Continue on ASA if OK with gen surgery  ? EKG reviewed - no baseline - there is TWF in II, V3 and TWI in I, AVL V1-2  § Could be stress response, patient denies chest pain  § Continue ASA for now  ? Consult Cardiology given unknown history and prior CABG  ? Appreciate recs  ? Obtain echo for baseline -50% EF, no wall motion abnormalities  · DM2 with hyperglycemia: on farxiga, januvia, trulicity and amaryl PTA. ? Hold oral meds  ? SSI while npo, hypoglycemia protocol  ? Checking A1c.   7.7%. · Erythrocytosis: likely 2/2 dehydration, however patient is on testosterone so may need to stop this therapy if remains. ? Monitor CBC -improving down to normal range after fluids. No baseline labs to compare to. · Depression: on risperdal, cymbalta - continue  · GEENA with inability to tolerate CPAP. Will need close monitoring postop. · Anxiety: on lorazapam nightly 0.5mg. · Chronic Back Pain: patient is chronically on MS Contin 30mg TID, Lyrica, Mobic, and Norco 10mg 5x daily prn. He follows his PCP for this. I reviewed PDMP. · Morbid Obesity: BMI 41, advise weight loss. · Former tobacco      Expected discharge date:  tbd    Disposition: pending course  [] Home  [] TCU  [] Rehab  [] Psych  [] SNF  [] [place]  [] Other-    ===================================================================      Chief Complaint: Abdominal pain    Hospital Course: Per HPI, Calista Prieto is a 61 y.o. male with PMHx of asthma, obesity, GEENA with noncompliance, CAD status post CABG, former tobacco abuse who presents to [facility] with abdominal pain. Patient reports that this started last night suddenly and was described as a sharp pain in the right upper quadrant which radiated along his costal margin to the right posteriorly.   Nothing made it better or worse, he reports a poor appetite and did not eat much during this time. He had some nausea but denied any vomiting. Admitted to loose stools. He reports that he had similar symptoms about a week ago which lasted 2 to 3 days and spontaneously resolved on their own. During that time he had dark/orange urine. He denies any associated fevers or chills, shortness of breath, chest pain or palpitations. He has chronic dyspnea on exertion that he attributes to his asthma however this is not become any worse.     The patient is a former tobacco user. He had a CABG in 2013 and is maintained on aspirin and bisoprolol/metoprolol and is currently followed by his PCP, has not seen a cardiologist in some time. Denies any anginal chest pain. He also has chronic back pain and prior surgery, maintained on MS Contin 30 mg 3 times daily, Norco  as needed, Lyrica, and Mobic by his PCP. He does not drink any alcohol. Subjective (past 24 hours): Patient seen at bedside, his pain is better controlled today with as needed Dilaudid, denies any nausea or vomiting. No new shortness of breath or any complaints of chest pain. No fevers or chills. Discussed case with patient alongside GI and general surgery team.  Planning for lap namrata tomorrow.       Medications:  Reviewed    Infusion Medications    dextrose      sodium chloride 20 mL/hr at 04/07/22 1521    lactated ringers 150 mL/hr at 04/06/22 2057     Scheduled Medications    mometasone-formoterol  2 puff Inhalation BID    piperacillin-tazobactam (ZOSYN) 3375 mg in dextrose 5% IVPB extended infusion (mini-bag)  3,375 mg IntraVENous Q8H    insulin lispro  0-6 Units SubCUTAneous Q6H    sodium chloride  1,000 mL IntraVENous Once    [Held by provider] atorvastatin  40 mg Oral Daily    DULoxetine  30 mg Oral Daily    [Held by provider] HYDROcodone-acetaminophen  1 tablet Oral TID    metoprolol tartrate  25 mg Oral BID    montelukast  10 mg Oral Nightly    morphine  30 mg Oral TID    pantoprazole  40 mg IntraVENous Daily    pregabalin  200 mg Oral TID    risperiDONE  1 mg Oral Daily    [Held by provider] alogliptin  12.5 mg Oral Daily    tamsulosin  0.4 mg Oral Nightly    sodium chloride flush  5-40 mL IntraVENous 2 times per day    [Held by provider] enoxaparin  40 mg SubCUTAneous Q12H     PRN Meds: glucagon (rDNA), dextrose, glucose, dextrose bolus (hypoglycemia) **OR** dextrose bolus (hypoglycemia), albuterol, sodium chloride flush, sodium chloride, polyethylene glycol, acetaminophen **OR** acetaminophen, HYDROmorphone **OR** HYDROmorphone, potassium chloride **OR** potassium alternative oral replacement **OR** potassium chloride, magnesium sulfate, bisacodyl      ROS: reviewed from prior note, full ROS unchanged unless otherwise stated in hospital course/subjective portion. Intake/Output Summary (Last 24 hours) at 4/7/2022 1720  Last data filed at 4/7/2022 0349  Gross per 24 hour   Intake 5 ml   Output 500 ml   Net -495 ml       Exam:  /76   Pulse 81   Temp 98.4 °F (36.9 °C) (Oral)   Resp 16   Ht 5' 8\" (1.727 m)   Wt 270 lb (122.5 kg)   SpO2 97%   BMI 41.05 kg/m²        General appearance: Obese, mild distress  Eyes:  Pupils equal, round, and reactive to light. Conjunctivae/corneas clear. HENT: Head normal in appearance. External nares normal.  Oral mucosa Dry without lesions. Hearing grossly intact. Neck: Supple, with full range of motion. Trachea midline. No gross JVD appreciated. Respiratory:  Normal respiratory effort. Clear to auscultation, bilaterally without rales or wheezes or rhonchi. Diminished overall. Cardiovascular: Normal rate, regular rhythm with normal S1/S2 without murmurs. No lower extremity edema. Abdomen: Normoactive bs. Protuberant, there is pain to palpation in the RUQ (improved from prior day). No rebound. Mild distension. Hepatomegaly noted below the right costal margin.    Musculoskeletal: There is no joint swelling or tenderness. Normal tone. No abnormal movements. Skin: Warm and dry. Violaceous macules of the bilateral shins anteriorly. Neurologic:  No focal sensory/motor deficits in the upper and lower extremities. Cranial nerves:  grossly non-focal 2-12. Psychiatric: Alert and oriented, normal insight and thought content. Capillary Refill: Brisk,< 3 seconds. Peripheral Pulses: +2 palpable, equal bilaterally. Labs:   Recent Labs     04/06/22  1240 04/07/22  0557   WBC 11.3* 8.1   HGB 19.7* 17.3   HCT 61.0* 54.1*    149     Recent Labs     04/06/22  1240 04/07/22  0557    139   K 3.8 4.1  4.1   CL 95* 103   CO2 24 22*   BUN 9 9   CREATININE 0.8 0.6   CALCIUM 10.3 9.1     Recent Labs     04/06/22  1240 04/07/22  0557   * 261*   * 320*   BILIDIR 1.9* 4.4*   BILITOT 2.9* 5.1*   ALKPHOS 191* 198*     No results for input(s): INR in the last 72 hours. No results for input(s): Velia Breeze in the last 72 hours. Recent Labs     04/06/22  1524   PROCAL 0.13*      Lab Results   Component Value Date    NITRU NEGATIVE 04/06/2022    WBCUA NONE SEEN 04/06/2022    BACTERIA NONE SEEN 04/06/2022    RBCUA 0-2 04/06/2022    BLOODU NEGATIVE 04/06/2022    GLUCOSEU >= 1000 04/06/2022       Radiology (48 hours):  CT ABDOMEN PELVIS W IV CONTRAST Additional Contrast? None    Result Date: 4/6/2022  1. Marked gallbladder distention with high density sludge/stones layering within the gallbladder and mild pericholecystic edema suggests acute cholecystitis in the appropriate clinical setting. No biliary ductal dilatation is observed. Correlate with liver function tests. 2. Normal appendix. Colonic diverticulosis without diverticulitis. Moderate retained fecal material seen throughout the colon suggesting fecal stasis. No bowel obstruction. 3. Chronic findings are discussed. **This report has been created using voice recognition software.   It may contain minor errors which are inherent in voice recognition technology. ** Final report electronically signed by Dr Princess Kahn on 4/6/2022 2:19 PM    MRI ABDOMEN W WO CONTRAST MRCP    Result Date: 4/7/2022  1. Tiny biliary calculi are seen in the common bile duct relating to choledocholithiasis. 2. Marked gallbladder distention with gallstones and sludge with pericholecystic fluid. Findings are suspicious for acute cholecystitis. 3. Marked hepatomegaly. Hepatic steatosis. 4. Other findings as described above. **This report has been created using voice recognition software. It may contain minor errors which are inherent in voice recognition technology. ** Final report electronically signed by Dr Beba Munoz on 4/7/2022 11:20 AM    [address] [place] [address]    Result Date: 4/6/2022  1. Marked gallbladder distention with pericholecystic fluid, gallbladder debris and sludge. Findings likely reflect acute cholecystitis. 2. Hepatomegaly. Hepatic steatosis. **This report has been created using voice recognition software. It may contain minor errors which are inherent in voice recognition technology. ** Final report electronically signed by Dr Beba Munoz on 4/6/2022 4:19 PM       DVT prophylaxis:    [x] Lovenox - hold preop  [] SCDs  [] SQ Heparin  [] Encourage ambulation   [] Already on Anticoagulation       Diet: ADULT DIET;  Clear Liquid  Diet NPO Exceptions are: Sips of Water with Meds  Code Status: Full Code  PT/OT: tbd  IVF: yes    Electronically signed by Ammy Rashid DO on 4/7/2022 at 5:20 PM

## 2022-04-07 NOTE — CARE COORDINATION
4/7/22, 7:29 AM EDT  DISCHARGE PLANNING EVALUATION:    Magdalena Aguilar       Admitted: 4/6/2022/ [address] day: 1   Location: [facility]27/027-A Reason for admit: Acute cholecystitis [K81.0]   PMH:  has a past medical history of CAD (coronary artery disease), Diabetes mellitus ([address] Hyperlipidemia, Hypertension, Movement disorder, Neuromuscular disorder ([address] Pneumonia, and Psychiatric problem. Procedure:   4/6 US abdomen: 1. Marked gallbladder distention with pericholecystic fluid, gallbladder debris and sludge. Findings likely reflect acute cholecystitis. 2. Hepatomegaly. Hepatic steatosis. 4/6 CT abdomen/pelvis: 1. Marked gallbladder distention with high density sludge/stones layering within the gallbladder and mild pericholecystic edema suggests acute cholecystitis in the appropriate clinical setting. No biliary ductal dilatation is observed. Correlate with liver function tests. 2. Normal appendix. Colonic diverticulosis without diverticulitis. Moderate retained fecal material seen throughout the colon suggesting fecal stasis. No bowel obstruction. 3. Chronic findings are discussed. Barriers to Discharge:  Presented with abdominal pain. Admitted with cholecystitis. Bilirubin 5.1. IVF. IV protonix. IV zosyn. Cardio and surgery consult. PCP: No primary care provider on file. Readmission Risk Score: 12.2 ( )%    Patient Goals/Plan/Treatment Preferences: Spoke with Florina Marsh, he plans to return home with is wife, uses a cane. Is independent and does not anticipate discharge needs. Follows for pain management with Dr. Callum Coto in [address] [place] [address]. Per patient request, Dr. Florina Hernandez's office notified of admission, spoke with [facility] who will get message to him. Transportation/Food Security/Housekeeping Addressed:  No issues identified.

## 2022-04-07 NOTE — PROGRESS NOTES
Cordell Kelly MD  Daily Progress Note  Pt Name: Horacio Varela  Medical Record Number: [id number]  Date of Birth 1965   Today's Date: 4/7/2022  Chief complaint: I feel about the same  ASSESSMENT:   1. Hospital day # 1   2. Calculus cholecystitis  3. Elevated liver function test with rising bilirubin. Rule out choledocholithiasis  4. Polycythemia improved with hydration  5. Non-insulin-dependent diabetes mellitus  6. Asthma  7. GERD  8. Coronary artery disease status post remote revascularization  9.  has a past medical history of CAD (coronary artery disease), Diabetes mellitus ([facility] [address] [facility] Hyperlipidemia, Hypertension, Movement disorder, Neuromuscular disorder ([facility] [address] [facility] Pneumonia, and Psychiatric problem. RECOMMENDATIONS:   1. IV hydration and IV antibiotics  2. Analgesics and antiemetics as needed  3. N.p.o.  4. Lovenox for DVT prophylaxis  5. MRCP to evaluate for common bile duct stone  6. Cardiology evaluation pending. Plan OR tomorrow for cholecystectomy if they think he is stable from a cardiac standpoint otherwise may need to proceed with percutaneous cholecystostomy in the interval.  White count improved. SUBJECTIVE:   Neida Bustamante is hemodynamically stable. Complains of some pressure in the right upper quadrant. Bilirubin up to 5. Abdomen soft some tenderness in the right upper quadrant gallbladder not discretely palpable with his protuberant abdomen.   MEDICATIONS   Scheduled Meds:   piperacillin-tazobactam (ZOSYN) 3375 mg in dextrose 5% IVPB extended infusion (mini-bag)  3,375 mg IntraVENous Q8H    insulin lispro  0-6 Units SubCUTAneous Q6H    sodium chloride  1,000 mL IntraVENous Once    [Held by provider] atorvastatin  40 mg Oral Daily    DULoxetine  30 mg Oral Daily    [Held by provider] HYDROcodone-acetaminophen  1 tablet Oral TID    metoprolol tartrate  25 mg Oral BID    montelukast  10 mg Oral Nightly    morphine  30 mg Oral TID    pantoprazole  40 mg IntraVENous Daily    pregabalin  200 mg Oral TID    risperiDONE  1 mg Oral Daily    [Held by provider] alogliptin  12.5 mg Oral Daily    tamsulosin  0.4 mg Oral Nightly    sodium chloride flush  5-40 mL IntraVENous 2 times per day    enoxaparin  40 mg SubCUTAneous Q12H     Continuous Infusions:   dextrose      sodium chloride      lactated ringers 150 mL/hr at 22     PRN Meds:.glucagon (rDNA), dextrose, glucose, dextrose bolus (hypoglycemia) **OR** dextrose bolus (hypoglycemia), albuterol, sodium chloride flush, sodium chloride, polyethylene glycol, acetaminophen **OR** acetaminophen, HYDROmorphone **OR** HYDROmorphone, potassium chloride **OR** potassium alternative oral replacement **OR** potassium chloride, magnesium sulfate, bisacodyl  OBJECTIVE   CURRENT VITALS:  height is 5' 8\" (1.727 m) and weight is 270 lb (122.5 kg). His oral temperature is 98.1 °F (36.7 °C). His blood pressure is 133/74 and his pulse is 83. His respiration is 16 and oxygen saturation is 93%.    Temperature Range (24h):Temp: 98.1 °F (36.7 °C) Temp  Av.3 °F (36.8 °C)  Min: 98.1 °F (36.7 °C)  Max: 98.6 °F (37 °C)  BP Range (22F): Systolic (75FRP), PWQ:047 , Min:130 , VKU:080     Diastolic (55MKJ), JWL:71, Min:74, Max:99    Pulse Range (24h): Pulse  Av.8  Min: 83  Max: 89  Respiration Range (24h): Resp  Av.4  Min: 16  Max: 18  Current Pulse Ox (24h):  SpO2: 93 %  Pulse Ox Range (24h):  SpO2  Av.5 %  Min: 91 %  Max: 98 %  Oxygen Amount and Delivery: O2 Flow Rate (L/min): 2 L/min  Incentive Spirometry Tx:            GENERAL: alert, no distress  LUNGS: clear to ausculation, without wheezes, rales or rhonci  HEART: normal rate and regular rhythm  ABDOMEN: Protuberant softly distended some subjective tenderness in the right upper quadrant without guarding   EXTREMITY: no cyanosis, clubbing or edema  In: 5 [I.V.:5]  Out: 500 [Urine:500]     Date 22 0000 - 22 9400 [facility] [id number] 24 Hour Total   INTAKE   Shift Total(mL/kg)       OUTPUT   Urine(mL/kg/hr) 500(0.5)   500   Shift Total(mL/kg) 500(4.1)   500(4.1)   Weight (kg) 122.5 122.5 122.5 122.5     LABS     Recent Labs     04/06/22  1240 04/07/22  0557   WBC 11.3* 8.1   HGB 19.7* 17.3   HCT 61.0* 54.1*    149    139   K 3.8 4.1  4.1   CL 95* 103   CO2 24 22*   BUN 9 9   CREATININE 0.8 0.6   CALCIUM 10.3 9.1      No results for input(s): PTT, INR in the last 72 hours. Invalid input(s): PT  Recent Labs     04/06/22  1240 04/06/22  1524 04/07/22  0557   *  --  261*   *  --  320*   BILITOT 2.9*  --  5.1*   BILIDIR 1.9*  --  4.4*   LIPASE 44.4 49.1  --    LACTA  --  2.1*  --      Recent Labs     04/06/22  1240   TROPONINT < 0.010         RADIOLOGY     US ABDOMEN LIMITED   Final Result   1. Marked gallbladder distention with pericholecystic fluid, gallbladder debris and sludge. Findings likely reflect acute cholecystitis. 2. Hepatomegaly. Hepatic steatosis. **This report has been created using voice recognition software. It may contain minor errors which are inherent in voice recognition technology. **      Final report electronically signed by Dr Conrad Carias on 4/6/2022 4:19 PM      CT ABDOMEN PELVIS W IV CONTRAST Additional Contrast? None   Final Result   1. Marked gallbladder distention with high density sludge/stones layering within the gallbladder and mild pericholecystic edema suggests acute cholecystitis in the appropriate clinical setting. No biliary ductal dilatation is observed. Correlate with    liver function tests. 2. Normal appendix. Colonic diverticulosis without diverticulitis. Moderate retained fecal material seen throughout the colon suggesting fecal stasis. No bowel obstruction. 3. Chronic findings are discussed. **This report has been created using voice recognition software.   It may contain minor errors which are inherent in voice recognition technology. **      Final report electronically signed by Dr Sharita Avina on 4/6/2022 2:19 PM      MRI ABDOMEN W WO CONTRAST MRCP    (Results Pending)       Electronically signed by Eliana Pandey MD on 4/7/2022 at 8:14 AM

## 2022-04-07 NOTE — CONSULTS
Consult History & Physical      Patient:  Eliza Pradhan  YOB: 1965  MRN: [id number]     Acct: [de-identified]    Chief Complaint:    Chief Complaint   Patient presents with    Abdominal Pain    Flank Pain       Date of Service: Pt seen/examined in consultation on 4/7/2022    History Of Present Illness:      61 y.o. male who we are asked to see/evaluate by Tripp Guaman DO for medical management of possible ERCP. He came to the ED yesterday for RUQ pain that started the previous night. Endorses associated nausea, no vomiting. He denies aggravating or alleviating factors to the pain. Denies fever or chills. Denies diarrhea, constipation, melena, and hematochezia. Denies history of abdominal surgeries. He is on chronic opioids for a history of chronic back pain. Noted to have elevated LFTs that are trending up. Initial WBC 11.3. He has been afebrile since admission. CT A/P demonstrated marked gallbladder distention with high density sludge/stones layering within the gallbladder & mild pericholecystic edema suggesting acute cholecystitis, no biliary ductal dilatation. US abdomen demonstrated marked gallbladder distention with pericholecystic fluid, gallbladder debris & sludge, hepatomegaly, & hepatic steatosis. MRCP demonstrated tiny biliary calculi in the CBD relating to choledocholithiasis, marked gallbladder distention with gallstones & sludge with pericholecystic fluid, marked hepatomegaly, hepatic steatosis. Past Medical History:    Past Medical History:   Diagnosis Date    CAD (coronary artery disease)     Diabetes mellitus ([address]     Hyperlipidemia     Hypertension     Movement disorder     Neuromuscular disorder [facility]     Pneumonia     Psychiatric problem        Home Medications:  Prior to Admission medications    Medication Sig Start Date End Date Taking?  Authorizing Provider   metoprolol succinate (TOPROL XL) 50 MG extended release tablet Take 50 mg by mouth daily   Yes Historical Provider, MD   fluticasone-umeclidin-vilant (Jordana Range) 100-62.5-25 MCG/INH AEPB Inhale 1 puff into the lungs daily   Yes Historical Provider, MD   fluticasone (FLONASE) 50 MCG/ACT nasal spray 1 spray by Each Nostril route daily   Yes Historical Provider, MD   Dulaglutide (TRULICITY SC) Inject into the skin once a week Patient takes each Sunday evening   Yes Historical Provider, MD   glimepiride (AMARYL) 4 MG tablet Take 4 mg by mouth every morning (before breakfast)   Yes Historical Provider, MD   risperiDONE (RISPERDAL) 1 MG tablet Take 1 mg by mouth daily   Yes Historical Provider, MD   DULoxetine (CYMBALTA) 30 MG extended release capsule Take 30 mg by mouth daily   Yes Historical Provider, MD   HYDROcodone-acetaminophen (NORCO)  MG per tablet Take 1 tablet by mouth in the morning, at noon, and at bedtime. Yes Historical Provider, MD   pregabalin (LYRICA) 200 MG capsule Take 200 mg by mouth in the morning, at noon, and at bedtime. Yes Historical Provider, MD   montelukast (SINGULAIR) 10 MG tablet Take 10 mg by mouth nightly   Yes Historical Provider, MD   tamsulosin (FLOMAX) 0.4 MG capsule Take 0.4 mg by mouth at bedtime    Yes Historical Provider, MD   atorvastatin (LIPITOR) 40 MG tablet Take 40 mg by mouth nightly    Yes Historical Provider, MD   pantoprazole (PROTONIX) 40 MG tablet Take 40 mg by mouth 2 times daily    Yes Historical Provider, MD   bisoprolol (ZEBETA) 5 MG tablet Take 5 mg by mouth at bedtime    Yes Historical Provider, MD   SITagliptin (JANUVIA) 100 MG tablet Take 100 mg by mouth at bedtime    Yes Historical Provider, MD   LORazepam (ATIVAN) 0.5 MG tablet Take 0.5 mg by mouth at bedtime. Yes Historical Provider, MD   meloxicam (MOBIC) 15 MG tablet Take 15 mg by mouth daily   Yes Historical Provider, MD   ketoconazole (NIZORAL) 2 % shampoo Apply topically daily as needed for Itching Apply topically daily as needed.    Yes Historical Provider, MD   desonide (DESOWEN) 0.05 % cream Apply topically 2 times daily Apply topically 2 times daily. Yes Historical Provider, MD   ipratropium (ATROVENT) 0.03 % nasal spray 2 sprays by Each Nostril route daily   Yes Historical Provider, MD   aspirin 81 MG chewable tablet Take 81 mg by mouth daily   Yes Historical Provider, MD   morphine (MS CONTIN) 30 MG extended release tablet Take 30 mg by mouth in the morning, at noon, and at bedtime. Morning, noon and bedtime   Yes Historical Provider, MD   dapagliflozin (FARXIGA) 10 MG tablet Take 10 mg by mouth every morning  Patient not taking: Reported on 4/6/2022    Historical Provider, MD       Surgical History:  Past Surgical History:   Procedure Laterality Date    BACK SURGERY      CARDIAC SURGERY      COLONOSCOPY      ENDOSCOPY, COLON, DIAGNOSTIC         Family History:  History reviewed. No pertinent family history. Past GI History:  Cholelithiasis, hepatomegaly, hepatic steatosis    Allergies:  Abilify [aripiprazole] and Metformin and related    Social History:   TOBACCO:   reports that he has never smoked. He has never used smokeless tobacco.  ETOH:   reports current alcohol use. Review Of Systems  GENERAL: No fever, chills or weight loss. EYES:  No  blurred vision, double vision   CARDIOVASCULAR: No chest pain or palpitations. RESPIRATORY:  No dyspnea or cough. GI:  See HPI  MUSCULOSKELETAL: No new painful or swollen joints or myalgias. :   No dysuria or hematuria. SKIN:  No rashes or jaundice. NEUROLOGIC:  No headaches or seizures, numbness or tingling of arms, or legs. PSYCH:  No anxiety or depression. ENDOCRINE:  No polyuria or polydipsia. BLOOD:  No anemia, bleeding disorder, blood or blood product transfusion.       PHYSICAL EXAM:  /68   Pulse 95   Temp 98.2 °F (36.8 °C) (Oral)   Resp 16   Ht 5' 8\" (1.727 m)   Wt 270 lb (122.5 kg)   SpO2 92%   BMI 41.05 kg/m²     General appearance: Acutely ill appearing male  HEENT: Normal cephalic, atraumatic without obvious deformity. Pupils equal, round, and reactive to light. Neck: Supple, with full range of motion. No jugular venous distention. Trachea midline. Respiratory:  Normal respiratory effort. Clear, diminished to auscultation, bilaterally without Rales/Wheezes/Rhonchi. Cardiovascular: Regular rate and rhythm without murmurs, rubs or gallops. Abdomen: Soft, obese, tender to RUQ with palpation, non-distended with active bowel sounds. Hepatomegaly noted. Musculoskeletal: No clubbing, cyanosis or edema bilaterally. Skin: Pink, warm, dry. No rashes or lesions. Psychiatric: Alert and oriented, thought content appropriate, normal insight    Labs:   Recent Labs     04/07/22  0557   WBC 8.1   HGB 17.3   HCT 54.1*        Recent Labs     04/07/22  0557      K 4.1  4.1      CO2 22*   BUN 9   CREATININE 0.6   CALCIUM 9.1     Recent Labs     04/07/22  0557   *   *   BILIDIR 4.4*   BILITOT 5.1*   ALKPHOS 198*     No results for input(s): INR in the last 72 hours. Radiology:   CT abdomen/pelvis W IV contrast 04/06/22  FINDINGS:    Lung bases: Dependent atelectasis is noted at the lung bases.       Liver/gallbladder/bilary tree: The gallbladder is markedly distended and contains high density material, likely sludge and stones. Pericholecystic fat stranding is present. Hepatic steatosis and mild hepatomegaly is observed. No liver lesions are    identified.       Pancreas: Normal.   Spleen : Normal.   Adrenal glands: Normal.       Kidneys/ ureters/ bladder: No renal calculus, hydronephrosis, or hydroureter is present. The urinary bladder is partially distended and grossly unremarkable.       Gastrointestinal:  Moderate retained fecal material seen throughout the colon. Colonic diverticulosis without diverticulitis is observed. No bowel obstruction, free fluid, fluid collection, or free air is visualized. The appendix is normal.       Retroperitoneum / lymph nodes:  The aorta is not dilated. No lymphadenopathy is present.       Pelvis: The prostate gland is not enlarged.       Musculoskeletal: Posterior fusion hardware and intervertebral disc spacer at L5-S1 appear intact. Multilevel degenerative disc disease is observed. The visualized skeletal structures appear intact.           Impression   1. Marked gallbladder distention with high density sludge/stones layering within the gallbladder and mild pericholecystic edema suggests acute cholecystitis in the appropriate clinical setting. No biliary ductal dilatation is observed. Correlate with    liver function tests.       2. Normal appendix. Colonic diverticulosis without diverticulitis. Moderate retained fecal material seen throughout the colon suggesting fecal stasis. No bowel obstruction.       3. Chronic findings are discussed.         US abdomen 04/06/22  FINDINGS:    LIVER:    Increased echogenicity of the liver with loss of posterior sound transmission which greatly limits the sensitivity for detecting hepatic mass and intrahepatic biliary dilatation. The right hepatic lobe measures 18.2 cm and markedly enlarged.       GALLBLADDER:   The gallbladder is markedly distended marked gallbladder wall thickening is seen. Pericholecystic fluid is present. Large amount of debris and sludge is seen in the gallbladder. . . Negative  sonographic Theodore's sign       COMMON DUCT:    The common bile duct measures 6 mm and is not dilated       PANCREAS:    The pancreas is not visualized due to overlying bowel gas. .               Impression   1. Marked gallbladder distention with pericholecystic fluid, gallbladder debris and sludge. Findings likely reflect acute cholecystitis.       2. Hepatomegaly. Hepatic steatosis. MRCP 04/07/22  FINDINGS:        LOWER THORAX: No significant lower thoracic findings.       HEPATOBILIARY: Marked hepatomegaly fatty infiltration of the liver. No focal hepatic mass.  Unremarkable hepatic surface morphology.  Marked distention of the gallbladder. Gallstones and sludge seen within the gallbladder. Mild pericholecystic edema. Tiny    hyperintensities on T2 weighted imaging are seen within the common bile duct reflect the presence of tiny biliary stones.           PANCREAS AND SPLEEN: The pancreatic duct is not dilated. Mild atrophy of the pancreas. No peripancreatic abnormality.  The spleen is not enlarged       RETROPERITONEUM: The kidneys are unremarkable.  The adrenal glands are not enlarged       BOWEL AND PERITONEUM:  No bowel obstruction or acute inflammatory bowel process.  .       VASCULAR: The abdominal aorta is not aneurysmal. The main portal vein, superior mesenteric vein, and splenic vein are patent.       LYMPH NODES: No significantly enlarged lymph nodes are seen.       BONES: No aggressive bony lesions noted.  Degenerative changes of the visualized thoracolumbar spine. Dextroscoliosis of the lumbar spine. Surgical hardware is seen L5-S1.               Impression   1. Tiny biliary calculi are seen in the common bile duct relating to choledocholithiasis.       2. Marked gallbladder distention with gallstones and sludge with pericholecystic fluid. Findings are suspicious for acute cholecystitis.       3. Marked hepatomegaly. Hepatic steatosis.       4. Other findings as described above. Code Status: Full Code    ASSESSMENT:  1. RUQ pain  2. Nausea without vomiting  3. Acute cholecystitis  4. Choledocholithiasis noted on MRCP  5. Acute hypoxic respiratory failure  6. Elevated LFTs  7. Hyperbilirubinemia  8. Hepatomegaly  9. Hepatic steatosis  10. Leukocytosis- resolved  11. Asthma, not exacerbated  12. DM  13. H/O chronic back pain with chronic opioid use  14. GEENA  15. H/O anxiety  16. Morbid obesity    PLAN:    IVF  Monitor HFP  Continue ATBs  NPO at midnight  Hold Lovenox  Case discussed with general surgery, Dr. Tony Castro who wishes to proceed with cholecystectomy with [facility] 04/08/22 given the state of the gallbladder. GI is in agreement with this plan  Await [facility] results  Case discussed with primary Dr. Javy Jackson  RN updated  Supportive care per primary team  Will follow       Case reviewed and impression/plan reviewed in collaboration with Dr. Kaleb Benítez  Electronically signed by ANNA Gerber CNP on 4/7/2022 at 2:27 PM    [facility]  Thank you for the consultation.

## 2022-04-08 ENCOUNTER — ANESTHESIA EVENT (OUTPATIENT)
Dept: OPERATING ROOM | Age: 57
DRG: 853 | End: 2022-04-08
Payer: MEDICARE

## 2022-04-08 ENCOUNTER — ANESTHESIA (OUTPATIENT)
Dept: OPERATING ROOM | Age: 57
DRG: 853 | End: 2022-04-08
Payer: MEDICARE

## 2022-04-08 VITALS
DIASTOLIC BLOOD PRESSURE: 64 MMHG | OXYGEN SATURATION: 99 % | RESPIRATION RATE: 21 BRPM | SYSTOLIC BLOOD PRESSURE: 130 MMHG

## 2022-04-08 LAB
ALBUMIN SERPL-MCNC: 3.6 G/DL (ref 3.5–5.1)
ALP BLD-CCNC: 210 U/L (ref 38–126)
ALT SERPL-CCNC: 311 U/L (ref 11–66)
ANION GAP SERPL CALCULATED.3IONS-SCNC: 14 MEQ/L (ref 8–16)
AST SERPL-CCNC: 197 U/L (ref 5–40)
BILIRUB SERPL-MCNC: 3.5 MG/DL (ref 0.3–1.2)
BUN BLDV-MCNC: 9 MG/DL (ref 7–22)
CALCIUM SERPL-MCNC: 8.7 MG/DL (ref 8.5–10.5)
CHLORIDE BLD-SCNC: 99 MEQ/L (ref 98–111)
CO2: 20 MEQ/L (ref 23–33)
CREAT SERPL-MCNC: 0.7 MG/DL (ref 0.4–1.2)
GFR SERPL CREATININE-BSD FRML MDRD: > 90 ML/MIN/1.73M2
GLUCOSE BLD-MCNC: 132 MG/DL (ref 70–108)
GLUCOSE BLD-MCNC: 142 MG/DL (ref 70–108)
GLUCOSE BLD-MCNC: 143 MG/DL (ref 70–108)
GLUCOSE BLD-MCNC: 161 MG/DL (ref 70–108)
GLUCOSE BLD-MCNC: 162 MG/DL (ref 70–108)
POTASSIUM SERPL-SCNC: 5 MEQ/L (ref 3.5–5.2)
REASON FOR REJECTION: NORMAL
REJECTED TEST: NORMAL
SODIUM BLD-SCNC: 133 MEQ/L (ref 135–145)
TOTAL PROTEIN: 6.6 G/DL (ref 6.1–8)

## 2022-04-08 PROCEDURE — 7100000001 HC PACU RECOVERY - ADDTL 15 MIN: Performed by: SURGERY

## 2022-04-08 PROCEDURE — 2720000010 HC SURG SUPPLY STERILE: Performed by: SURGERY

## 2022-04-08 PROCEDURE — 2500000003 HC RX 250 WO HCPCS: Performed by: NURSE ANESTHETIST, CERTIFIED REGISTERED

## 2022-04-08 PROCEDURE — 6360000002 HC RX W HCPCS: Performed by: INTERNAL MEDICINE

## 2022-04-08 PROCEDURE — 88304 TISSUE EXAM BY PATHOLOGIST: CPT

## 2022-04-08 PROCEDURE — 8E0W4CZ ROBOTIC ASSISTED PROCEDURE OF TRUNK REGION, PERCUTANEOUS ENDOSCOPIC APPROACH: ICD-10-PCS | Performed by: SURGERY

## 2022-04-08 PROCEDURE — 2580000003 HC RX 258: Performed by: SURGERY

## 2022-04-08 PROCEDURE — C1894 INTRO/SHEATH, NON-LASER: HCPCS | Performed by: SURGERY

## 2022-04-08 PROCEDURE — 2060000000 HC ICU INTERMEDIATE R&B

## 2022-04-08 PROCEDURE — 94760 N-INVAS EAR/PLS OXIMETRY 1: CPT

## 2022-04-08 PROCEDURE — 80048 BASIC METABOLIC PNL TOTAL CA: CPT

## 2022-04-08 PROCEDURE — 0FT44ZZ RESECTION OF GALLBLADDER, PERCUTANEOUS ENDOSCOPIC APPROACH: ICD-10-PCS | Performed by: SURGERY

## 2022-04-08 PROCEDURE — C9113 INJ PANTOPRAZOLE SODIUM, VIA: HCPCS | Performed by: INTERNAL MEDICINE

## 2022-04-08 PROCEDURE — 2500000003 HC RX 250 WO HCPCS: Performed by: SURGERY

## 2022-04-08 PROCEDURE — 3600000019 HC SURGERY ROBOT ADDTL 15MIN: Performed by: SURGERY

## 2022-04-08 PROCEDURE — 6360000002 HC RX W HCPCS: Performed by: SURGERY

## 2022-04-08 PROCEDURE — 6370000000 HC RX 637 (ALT 250 FOR IP): Performed by: SURGERY

## 2022-04-08 PROCEDURE — 6360000002 HC RX W HCPCS: Performed by: NURSE ANESTHETIST, CERTIFIED REGISTERED

## 2022-04-08 PROCEDURE — 6370000000 HC RX 637 (ALT 250 FOR IP): Performed by: INTERNAL MEDICINE

## 2022-04-08 PROCEDURE — 94640 AIRWAY INHALATION TREATMENT: CPT

## 2022-04-08 PROCEDURE — 84450 TRANSFERASE (AST) (SGOT): CPT

## 2022-04-08 PROCEDURE — 36415 COLL VENOUS BLD VENIPUNCTURE: CPT

## 2022-04-08 PROCEDURE — 2709999900 HC NON-CHARGEABLE SUPPLY: Performed by: SURGERY

## 2022-04-08 PROCEDURE — 47563 LAPARO CHOLECYSTECTOMY/GRAPH: CPT | Performed by: SURGERY

## 2022-04-08 PROCEDURE — 82247 BILIRUBIN TOTAL: CPT

## 2022-04-08 PROCEDURE — 84075 ASSAY ALKALINE PHOSPHATASE: CPT

## 2022-04-08 PROCEDURE — 3700000000 HC ANESTHESIA ATTENDED CARE: Performed by: SURGERY

## 2022-04-08 PROCEDURE — 3600000009 HC SURGERY ROBOT BASE: Performed by: SURGERY

## 2022-04-08 PROCEDURE — 82040 ASSAY OF SERUM ALBUMIN: CPT

## 2022-04-08 PROCEDURE — 84155 ASSAY OF PROTEIN SERUM: CPT

## 2022-04-08 PROCEDURE — 82948 REAGENT STRIP/BLOOD GLUCOSE: CPT

## 2022-04-08 PROCEDURE — 2580000003 HC RX 258: Performed by: PHYSICIAN ASSISTANT

## 2022-04-08 PROCEDURE — 6360000002 HC RX W HCPCS: Performed by: ANESTHESIOLOGY

## 2022-04-08 PROCEDURE — S2900 ROBOTIC SURGICAL SYSTEM: HCPCS | Performed by: SURGERY

## 2022-04-08 PROCEDURE — 3700000001 HC ADD 15 MINUTES (ANESTHESIA): Performed by: SURGERY

## 2022-04-08 PROCEDURE — 7100000000 HC PACU RECOVERY - FIRST 15 MIN: Performed by: SURGERY

## 2022-04-08 PROCEDURE — 84460 ALANINE AMINO (ALT) (SGPT): CPT

## 2022-04-08 PROCEDURE — 2700000000 HC OXYGEN THERAPY PER DAY

## 2022-04-08 PROCEDURE — 99233 SBSQ HOSP IP/OBS HIGH 50: CPT | Performed by: INTERNAL MEDICINE

## 2022-04-08 PROCEDURE — 2500000003 HC RX 250 WO HCPCS

## 2022-04-08 RX ORDER — DEXAMETHASONE SODIUM PHOSPHATE 10 MG/ML
INJECTION, EMULSION INTRAMUSCULAR; INTRAVENOUS PRN
Status: DISCONTINUED | OUTPATIENT
Start: 2022-04-08 | End: 2022-04-08 | Stop reason: SDUPTHER

## 2022-04-08 RX ORDER — LABETALOL 20 MG/4 ML (5 MG/ML) INTRAVENOUS SYRINGE
5 EVERY 10 MIN PRN
Status: DISCONTINUED | OUTPATIENT
Start: 2022-04-08 | End: 2022-04-08 | Stop reason: HOSPADM

## 2022-04-08 RX ORDER — SUCCINYLCHOLINE CHLORIDE 20 MG/ML
INJECTION INTRAMUSCULAR; INTRAVENOUS PRN
Status: DISCONTINUED | OUTPATIENT
Start: 2022-04-08 | End: 2022-04-08 | Stop reason: SDUPTHER

## 2022-04-08 RX ORDER — BUPIVACAINE HYDROCHLORIDE 5 MG/ML
INJECTION, SOLUTION EPIDURAL; INTRACAUDAL PRN
Status: DISCONTINUED | OUTPATIENT
Start: 2022-04-08 | End: 2022-04-08 | Stop reason: ALTCHOICE

## 2022-04-08 RX ORDER — INDOCYANINE GREEN AND WATER 25 MG
2.5 KIT INJECTION ONCE
Status: DISCONTINUED | OUTPATIENT
Start: 2022-04-08 | End: 2022-04-08 | Stop reason: HOSPADM

## 2022-04-08 RX ORDER — FENTANYL CITRATE 50 UG/ML
50 INJECTION, SOLUTION INTRAMUSCULAR; INTRAVENOUS EVERY 5 MIN PRN
Status: COMPLETED | OUTPATIENT
Start: 2022-04-08 | End: 2022-04-08

## 2022-04-08 RX ORDER — LIDOCAINE HYDROCHLORIDE 20 MG/ML
INJECTION, SOLUTION INFILTRATION; PERINEURAL PRN
Status: DISCONTINUED | OUTPATIENT
Start: 2022-04-08 | End: 2022-04-08 | Stop reason: SDUPTHER

## 2022-04-08 RX ORDER — FENTANYL CITRATE 50 UG/ML
INJECTION, SOLUTION INTRAMUSCULAR; INTRAVENOUS PRN
Status: DISCONTINUED | OUTPATIENT
Start: 2022-04-08 | End: 2022-04-08 | Stop reason: SDUPTHER

## 2022-04-08 RX ORDER — ROCURONIUM BROMIDE 10 MG/ML
INJECTION, SOLUTION INTRAVENOUS PRN
Status: DISCONTINUED | OUTPATIENT
Start: 2022-04-08 | End: 2022-04-08 | Stop reason: SDUPTHER

## 2022-04-08 RX ORDER — NEOSTIGMINE METHYLSULFATE 5 MG/5 ML
SYRINGE (ML) INTRAVENOUS PRN
Status: DISCONTINUED | OUTPATIENT
Start: 2022-04-08 | End: 2022-04-08 | Stop reason: SDUPTHER

## 2022-04-08 RX ORDER — IPRATROPIUM BROMIDE AND ALBUTEROL SULFATE 2.5; .5 MG/3ML; MG/3ML
1 SOLUTION RESPIRATORY (INHALATION)
Status: DISCONTINUED | OUTPATIENT
Start: 2022-04-08 | End: 2022-04-09

## 2022-04-08 RX ORDER — GLYCOPYRROLATE 1 MG/5 ML
SYRINGE (ML) INTRAVENOUS PRN
Status: DISCONTINUED | OUTPATIENT
Start: 2022-04-08 | End: 2022-04-08 | Stop reason: SDUPTHER

## 2022-04-08 RX ORDER — HYDROMORPHONE HCL 110MG/55ML
PATIENT CONTROLLED ANALGESIA SYRINGE INTRAVENOUS PRN
Status: DISCONTINUED | OUTPATIENT
Start: 2022-04-08 | End: 2022-04-08 | Stop reason: SDUPTHER

## 2022-04-08 RX ORDER — MEPERIDINE HYDROCHLORIDE 25 MG/ML
12.5 INJECTION INTRAMUSCULAR; INTRAVENOUS; SUBCUTANEOUS EVERY 5 MIN PRN
Status: DISCONTINUED | OUTPATIENT
Start: 2022-04-08 | End: 2022-04-08 | Stop reason: HOSPADM

## 2022-04-08 RX ORDER — MIDAZOLAM HYDROCHLORIDE 1 MG/ML
INJECTION INTRAMUSCULAR; INTRAVENOUS PRN
Status: DISCONTINUED | OUTPATIENT
Start: 2022-04-08 | End: 2022-04-08 | Stop reason: SDUPTHER

## 2022-04-08 RX ORDER — SODIUM CHLORIDE 0.9 % (FLUSH) 0.9 %
5-40 SYRINGE (ML) INJECTION PRN
Status: DISCONTINUED | OUTPATIENT
Start: 2022-04-08 | End: 2022-04-08 | Stop reason: HOSPADM

## 2022-04-08 RX ORDER — PROPOFOL 10 MG/ML
INJECTION, EMULSION INTRAVENOUS PRN
Status: DISCONTINUED | OUTPATIENT
Start: 2022-04-08 | End: 2022-04-08 | Stop reason: SDUPTHER

## 2022-04-08 RX ORDER — ONDANSETRON 2 MG/ML
INJECTION INTRAMUSCULAR; INTRAVENOUS PRN
Status: DISCONTINUED | OUTPATIENT
Start: 2022-04-08 | End: 2022-04-08 | Stop reason: SDUPTHER

## 2022-04-08 RX ORDER — LABETALOL 20 MG/4 ML (5 MG/ML) INTRAVENOUS SYRINGE
Status: COMPLETED
Start: 2022-04-08 | End: 2022-04-08

## 2022-04-08 RX ORDER — FENTANYL CITRATE 50 UG/ML
25 INJECTION, SOLUTION INTRAMUSCULAR; INTRAVENOUS EVERY 5 MIN PRN
Status: DISCONTINUED | OUTPATIENT
Start: 2022-04-08 | End: 2022-04-08 | Stop reason: HOSPADM

## 2022-04-08 RX ORDER — SODIUM CHLORIDE 9 MG/ML
INJECTION, SOLUTION INTRAVENOUS PRN
Status: DISCONTINUED | OUTPATIENT
Start: 2022-04-08 | End: 2022-04-08 | Stop reason: HOSPADM

## 2022-04-08 RX ORDER — SODIUM CHLORIDE 0.9 % (FLUSH) 0.9 %
5-40 SYRINGE (ML) INJECTION EVERY 12 HOURS SCHEDULED
Status: DISCONTINUED | OUTPATIENT
Start: 2022-04-08 | End: 2022-04-08 | Stop reason: HOSPADM

## 2022-04-08 RX ADMIN — ROCURONIUM BROMIDE 10 MG: 50 INJECTION, SOLUTION INTRAVENOUS at 11:11

## 2022-04-08 RX ADMIN — Medication 0.2 MG: at 10:09

## 2022-04-08 RX ADMIN — ROCURONIUM BROMIDE 20 MG: 50 INJECTION, SOLUTION INTRAVENOUS at 10:18

## 2022-04-08 RX ADMIN — FENTANYL CITRATE 25 MCG: 50 INJECTION, SOLUTION INTRAMUSCULAR; INTRAVENOUS at 14:14

## 2022-04-08 RX ADMIN — MORPHINE SULFATE 30 MG: 30 TABLET, FILM COATED, EXTENDED RELEASE ORAL at 15:34

## 2022-04-08 RX ADMIN — FENTANYL CITRATE 50 MCG: 50 INJECTION, SOLUTION INTRAMUSCULAR; INTRAVENOUS at 13:46

## 2022-04-08 RX ADMIN — PROPOFOL 150 MG: 10 INJECTION, EMULSION INTRAVENOUS at 10:04

## 2022-04-08 RX ADMIN — SODIUM CHLORIDE: 9 INJECTION, SOLUTION INTRAVENOUS at 06:34

## 2022-04-08 RX ADMIN — FENTANYL CITRATE 50 MCG: 50 INJECTION, SOLUTION INTRAMUSCULAR; INTRAVENOUS at 10:06

## 2022-04-08 RX ADMIN — PREGABALIN 200 MG: 75 CAPSULE ORAL at 08:51

## 2022-04-08 RX ADMIN — ROCURONIUM BROMIDE 30 MG: 50 INJECTION, SOLUTION INTRAVENOUS at 10:09

## 2022-04-08 RX ADMIN — LIDOCAINE HYDROCHLORIDE 100 MG: 20 INJECTION, SOLUTION INFILTRATION; PERINEURAL at 10:04

## 2022-04-08 RX ADMIN — FENTANYL CITRATE 50 MCG: 50 INJECTION, SOLUTION INTRAMUSCULAR; INTRAVENOUS at 10:32

## 2022-04-08 RX ADMIN — SUCCINYLCHOLINE CHLORIDE 160 MG: 20 INJECTION, SOLUTION INTRAMUSCULAR; INTRAVENOUS at 10:04

## 2022-04-08 RX ADMIN — FENTANYL CITRATE 50 MCG: 50 INJECTION, SOLUTION INTRAMUSCULAR; INTRAVENOUS at 12:59

## 2022-04-08 RX ADMIN — Medication 4 MG: at 11:58

## 2022-04-08 RX ADMIN — HYDROMORPHONE HYDROCHLORIDE 1.5 MG: 1 INJECTION, SOLUTION INTRAMUSCULAR; INTRAVENOUS; SUBCUTANEOUS at 17:03

## 2022-04-08 RX ADMIN — PIPERACILLIN AND TAZOBACTAM 3375 MG: 3; .375 INJECTION, POWDER, LYOPHILIZED, FOR SOLUTION INTRAVENOUS at 09:45

## 2022-04-08 RX ADMIN — PANTOPRAZOLE SODIUM 40 MG: 40 INJECTION, POWDER, FOR SOLUTION INTRAVENOUS at 08:53

## 2022-04-08 RX ADMIN — Medication 0.6 MG: at 11:58

## 2022-04-08 RX ADMIN — MONTELUKAST SODIUM 10 MG: 10 TABLET, FILM COATED ORAL at 23:46

## 2022-04-08 RX ADMIN — HYDROMORPHONE HYDROCHLORIDE 1 MG: 1 INJECTION, SOLUTION INTRAMUSCULAR; INTRAVENOUS; SUBCUTANEOUS at 23:47

## 2022-04-08 RX ADMIN — PIPERACILLIN AND TAZOBACTAM 3375 MG: 3; .375 INJECTION, POWDER, LYOPHILIZED, FOR SOLUTION INTRAVENOUS at 17:02

## 2022-04-08 RX ADMIN — LABETALOL 20 MG/4 ML (5 MG/ML) INTRAVENOUS SYRINGE 5 MG: at 12:50

## 2022-04-08 RX ADMIN — DULOXETINE HYDROCHLORIDE 30 MG: 30 CAPSULE, DELAYED RELEASE ORAL at 08:51

## 2022-04-08 RX ADMIN — ROCURONIUM BROMIDE 10 MG: 50 INJECTION, SOLUTION INTRAVENOUS at 10:54

## 2022-04-08 RX ADMIN — HYDROMORPHONE HYDROCHLORIDE 0.5 MG: 2 INJECTION INTRAMUSCULAR; INTRAVENOUS; SUBCUTANEOUS at 12:04

## 2022-04-08 RX ADMIN — PIPERACILLIN AND TAZOBACTAM 3375 MG: 3; .375 INJECTION, POWDER, LYOPHILIZED, FOR SOLUTION INTRAVENOUS at 23:54

## 2022-04-08 RX ADMIN — FENTANYL CITRATE 50 MCG: 50 INJECTION, SOLUTION INTRAMUSCULAR; INTRAVENOUS at 10:16

## 2022-04-08 RX ADMIN — INSULIN LISPRO 1 UNITS: 100 INJECTION, SOLUTION INTRAVENOUS; SUBCUTANEOUS at 08:55

## 2022-04-08 RX ADMIN — METOPROLOL TARTRATE 25 MG: 50 TABLET, FILM COATED ORAL at 08:51

## 2022-04-08 RX ADMIN — FENTANYL CITRATE 25 MCG: 50 INJECTION, SOLUTION INTRAMUSCULAR; INTRAVENOUS at 14:09

## 2022-04-08 RX ADMIN — MORPHINE SULFATE 30 MG: 30 TABLET, FILM COATED, EXTENDED RELEASE ORAL at 06:31

## 2022-04-08 RX ADMIN — FENTANYL CITRATE 50 MCG: 50 INJECTION, SOLUTION INTRAMUSCULAR; INTRAVENOUS at 13:41

## 2022-04-08 RX ADMIN — MORPHINE SULFATE 30 MG: 30 TABLET, FILM COATED, EXTENDED RELEASE ORAL at 21:12

## 2022-04-08 RX ADMIN — SODIUM CHLORIDE: 9 INJECTION, SOLUTION INTRAVENOUS at 11:26

## 2022-04-08 RX ADMIN — TAMSULOSIN HYDROCHLORIDE 0.4 MG: 0.4 CAPSULE ORAL at 23:46

## 2022-04-08 RX ADMIN — INSULIN LISPRO 1 UNITS: 100 INJECTION, SOLUTION INTRAVENOUS; SUBCUTANEOUS at 15:43

## 2022-04-08 RX ADMIN — DEXAMETHASONE SODIUM PHOSPHATE 10 MG: 10 INJECTION, EMULSION INTRAMUSCULAR; INTRAVENOUS at 10:16

## 2022-04-08 RX ADMIN — HYDROMORPHONE HYDROCHLORIDE 0.5 MG: 1 INJECTION, SOLUTION INTRAMUSCULAR; INTRAVENOUS; SUBCUTANEOUS at 13:51

## 2022-04-08 RX ADMIN — FENTANYL CITRATE 50 MCG: 50 INJECTION, SOLUTION INTRAMUSCULAR; INTRAVENOUS at 12:40

## 2022-04-08 RX ADMIN — RISPERIDONE 1 MG: 1 TABLET ORAL at 08:52

## 2022-04-08 RX ADMIN — IPRATROPIUM BROMIDE AND ALBUTEROL SULFATE 1 AMPULE: .5; 3 SOLUTION RESPIRATORY (INHALATION) at 21:09

## 2022-04-08 RX ADMIN — SODIUM CHLORIDE, PRESERVATIVE FREE 10 ML: 5 INJECTION INTRAVENOUS at 21:12

## 2022-04-08 RX ADMIN — METOPROLOL TARTRATE 25 MG: 50 TABLET, FILM COATED ORAL at 23:45

## 2022-04-08 RX ADMIN — PREGABALIN 200 MG: 75 CAPSULE ORAL at 15:34

## 2022-04-08 RX ADMIN — INSULIN LISPRO 1 UNITS: 100 INJECTION, SOLUTION INTRAVENOUS; SUBCUTANEOUS at 21:16

## 2022-04-08 RX ADMIN — PREGABALIN 200 MG: 75 CAPSULE ORAL at 21:11

## 2022-04-08 RX ADMIN — MIDAZOLAM 2 MG: 1 INJECTION INTRAMUSCULAR; INTRAVENOUS at 09:55

## 2022-04-08 RX ADMIN — HYDROMORPHONE HYDROCHLORIDE 0.5 MG: 1 INJECTION, SOLUTION INTRAMUSCULAR; INTRAVENOUS; SUBCUTANEOUS at 13:56

## 2022-04-08 RX ADMIN — ONDANSETRON 4 MG: 2 INJECTION INTRAMUSCULAR; INTRAVENOUS at 10:16

## 2022-04-08 ASSESSMENT — PULMONARY FUNCTION TESTS
PIF_VALUE: 35
PIF_VALUE: 22
PIF_VALUE: 29
PIF_VALUE: 26
PIF_VALUE: 13
PIF_VALUE: 30
PIF_VALUE: 30
PIF_VALUE: 29
PIF_VALUE: 30
PIF_VALUE: 27
PIF_VALUE: 34
PIF_VALUE: 29
PIF_VALUE: 26
PIF_VALUE: 25
PIF_VALUE: 28
PIF_VALUE: 1
PIF_VALUE: 30
PIF_VALUE: 20
PIF_VALUE: 16
PIF_VALUE: 30
PIF_VALUE: 20
PIF_VALUE: 28
PIF_VALUE: 29
PIF_VALUE: 24
PIF_VALUE: 29
PIF_VALUE: 28
PIF_VALUE: 29
PIF_VALUE: 6
PIF_VALUE: 30
PIF_VALUE: 30
PIF_VALUE: 29
PIF_VALUE: 30
PIF_VALUE: 29
PIF_VALUE: 28
PIF_VALUE: 26
PIF_VALUE: 29
PIF_VALUE: 28
PIF_VALUE: 21
PIF_VALUE: 29
PIF_VALUE: 28
PIF_VALUE: 27
PIF_VALUE: 29
PIF_VALUE: 31
PIF_VALUE: 29
PIF_VALUE: 28
PIF_VALUE: 30
PIF_VALUE: 29
PIF_VALUE: 30
PIF_VALUE: 5
PIF_VALUE: 29
PIF_VALUE: 30
PIF_VALUE: 26
PIF_VALUE: 30
PIF_VALUE: 26
PIF_VALUE: 27
PIF_VALUE: 28
PIF_VALUE: 29
PIF_VALUE: 30
PIF_VALUE: 30
PIF_VALUE: 31
PIF_VALUE: 29
PIF_VALUE: 26
PIF_VALUE: 29
PIF_VALUE: 30
PIF_VALUE: 29
PIF_VALUE: 27
PIF_VALUE: 28
PIF_VALUE: 29
PIF_VALUE: 16
PIF_VALUE: 28
PIF_VALUE: 30
PIF_VALUE: 29
PIF_VALUE: 24
PIF_VALUE: 29
PIF_VALUE: 28
PIF_VALUE: 29
PIF_VALUE: 30
PIF_VALUE: 27
PIF_VALUE: 3
PIF_VALUE: 26
PIF_VALUE: 3
PIF_VALUE: 1
PIF_VALUE: 16
PIF_VALUE: 31
PIF_VALUE: 1
PIF_VALUE: 29
PIF_VALUE: 29
PIF_VALUE: 20
PIF_VALUE: 25
PIF_VALUE: 35
PIF_VALUE: 26
PIF_VALUE: 30
PIF_VALUE: 28
PIF_VALUE: 21
PIF_VALUE: 29
PIF_VALUE: 29
PIF_VALUE: 16
PIF_VALUE: 16
PIF_VALUE: 28
PIF_VALUE: 29
PIF_VALUE: 27
PIF_VALUE: 29
PIF_VALUE: 29
PIF_VALUE: 27
PIF_VALUE: 27
PIF_VALUE: 29
PIF_VALUE: 27
PIF_VALUE: 32
PIF_VALUE: 17
PIF_VALUE: 29
PIF_VALUE: 31
PIF_VALUE: 21
PIF_VALUE: 20
PIF_VALUE: 25
PIF_VALUE: 31
PIF_VALUE: 31
PIF_VALUE: 28
PIF_VALUE: 29
PIF_VALUE: 27
PIF_VALUE: 0
PIF_VALUE: 26
PIF_VALUE: 27
PIF_VALUE: 29
PIF_VALUE: 26

## 2022-04-08 ASSESSMENT — PAIN SCALES - GENERAL
PAINLEVEL_OUTOF10: 8
PAINLEVEL_OUTOF10: 3
PAINLEVEL_OUTOF10: 2
PAINLEVEL_OUTOF10: 0
PAINLEVEL_OUTOF10: 6
PAINLEVEL_OUTOF10: 6
PAINLEVEL_OUTOF10: 7
PAINLEVEL_OUTOF10: 9
PAINLEVEL_OUTOF10: 8
PAINLEVEL_OUTOF10: 8
PAINLEVEL_OUTOF10: 6
PAINLEVEL_OUTOF10: 7
PAINLEVEL_OUTOF10: 8
PAINLEVEL_OUTOF10: 6
PAINLEVEL_OUTOF10: 6
PAINLEVEL_OUTOF10: 8
PAINLEVEL_OUTOF10: 6
PAINLEVEL_OUTOF10: 4
PAINLEVEL_OUTOF10: 7

## 2022-04-08 ASSESSMENT — PAIN DESCRIPTION - DESCRIPTORS
DESCRIPTORS: SHARP
DESCRIPTORS: SHARP
DESCRIPTORS: ACHING;SHARP;SORE
DESCRIPTORS: ACHING;SHARP;SORE

## 2022-04-08 ASSESSMENT — PAIN - FUNCTIONAL ASSESSMENT
PAIN_FUNCTIONAL_ASSESSMENT: ACTIVITIES ARE NOT PREVENTED

## 2022-04-08 ASSESSMENT — PAIN DESCRIPTION - PAIN TYPE
TYPE: ACUTE PAIN
TYPE: ACUTE PAIN;CHRONIC PAIN;SURGICAL PAIN
TYPE: ACUTE PAIN;CHRONIC PAIN;SURGICAL PAIN
TYPE: ACUTE PAIN

## 2022-04-08 ASSESSMENT — PAIN DESCRIPTION - ONSET
ONSET: ON-GOING
ONSET: GRADUAL
ONSET: ON-GOING
ONSET: GRADUAL

## 2022-04-08 ASSESSMENT — PAIN DESCRIPTION - LOCATION
LOCATION: ABDOMEN
LOCATION: ABDOMEN
LOCATION: ABDOMEN;BACK
LOCATION: ABDOMEN;BACK

## 2022-04-08 ASSESSMENT — PAIN DESCRIPTION - FREQUENCY
FREQUENCY: INTERMITTENT
FREQUENCY: CONTINUOUS
FREQUENCY: CONTINUOUS
FREQUENCY: INTERMITTENT

## 2022-04-08 ASSESSMENT — PAIN DESCRIPTION - PROGRESSION
CLINICAL_PROGRESSION: NOT CHANGED

## 2022-04-08 ASSESSMENT — PAIN DESCRIPTION - ORIENTATION
ORIENTATION: RIGHT
ORIENTATION: RIGHT;LEFT

## 2022-04-08 ASSESSMENT — PAIN SCALES - WONG BAKER: WONGBAKER_NUMERICALRESPONSE: 0

## 2022-04-08 NOTE — PROGRESS NOTES
Hospitalist Progress Note    Patient:  Arely Elkins    YOB: 1965  Unit/Bed:4K-26/026-A  MRN: [id number]    Acct: [de-identified]   PCP: Darian Horn DO    Date of Admission: 4/6/2022      Assessment/Plan:    · Acute Cholecystitis: ?cholangitis, + cholecystitis but no biliary dilation seen on CT abd/pelv. Lipase wnl, hepatitis as noted below. Suspect patient also passed a stone about a week ago as well based on his symptoms he describes. ? Empiric Zosyn, noted MRCP  ? Cardiology consult for preop eval - appreciate assistance  ? IVF support  ? S/p robotic Lap namrata on 4/8/22, EBL 50ml, placement of naomi drain (no IOC during procedure)  ? Dilaudid 1/1.5mg PRN (may need higher dose given significant home pain requirements). Bowel regimen is available. ? Schedule movantic  · Postop Acute Hypoxic Respiratory Failure: requiring HFNC (refused Bipap), comfortable and will continue breathing treatments and acapella/IS. · On 70% 60LPM  · OOB as able. Wean aggressively. · Cut back postop fluids to 100  · Obstructive Transaminitis (on history of FLD): likely secondary to above. Alk phos 191, , , bilirubin 2.9 with a direct bilirubin of 1.9 on arrival.  There was no biliary ductal dilatation, possible passed stone. ? Trend lfts - worsening on 4/7 stable on 4/8  ? MRCP noted small stones in CBD, GI consulted  ? ERCP if ongoing osbtructive hepatitis - no IOC performed during laparoscopy  ? Avoid hepatotoxins. Check hepatitis panel   · Sepsis POA: with WBC, near tachycardia secondary to above. See above for management  ? S/p Aggressive fluids. ? Antibiotics with Zosyn continued. · Elevated anion gap: will check VBG to determine if there is acidosis present. Bicarb on BMP is wnl, but does not rule out a mixed disorder. · PH was wnl. No further concerns. · Steatosis/Hepatomegaly: noted. Advise weight loss. Liver margin palpable in right mid abdomen.    · TTransamanitis as above  · Asthma: no in acute exacerbation. He is on singulair and Trelegy, but takes the inhalers prn.   ? Will start albuterol prn and IS  ? Add on dulera perioperatively for control. ? Recommend PFTs as outpatient  · Abnormal EKG / CAD s/p 4v CABG: CABG in 2013 with Dr. Holli Martinez. Reports he has not seen a Cardiologist for some time. He takes ASA and two BB (lopressor and bisoprolol) in addition to statin. Initial troponin is negative. ? Continue on ASA if OK with gen surgery  ? EKG reviewed - no baseline - there is TWF in II, V3 and TWI in I, AVL V1-2  § Could be stress response, patient denies chest pain  § Continue ASA for now  ? Consult Cardiology given unknown history and prior CABG  ? Appreciate recs  ? Obtain echo for baseline -50% EF, no wall motion abnormalities  · DM2 with hyperglycemia: on farxiga, januvia, trulicity and amaryl PTA. ? Hold oral meds  ? SSI while npo, hypoglycemia protocol  ? Checking A1c.   7.7%. · Erythrocytosis: likely 2/2 dehydration, however patient is on testosterone so may need to stop this therapy if remains. ? Monitor CBC -improving down to normal range after fluids. No baseline labs to compare to.  ? Repeat tomorrow with CBC postop. · Depression: on risperdal, cymbalta - continue  · GEENA with inability to tolerate CPAP. Will need close monitoring postop. · Anxiety: on lorazapam nightly 0.5mg. · Chronic Back Pain: patient is chronically on MS Contin 30mg TID, Lyrica, Mobic, and Norco 10mg 5x daily prn. He follows his PCP for this. I reviewed PDMP. · Morbid Obesity: BMI 41, advise weight loss.    · Former tobacco      Expected discharge date:  tbd    Disposition: pending course  [] Home  [] TCU  [] Rehab  [] Psych  [] SNF  [] [facility]  [] Other-    ===================================================================      Chief Complaint: Abdominal pain    Hospital Course: Per MARIN Estrella Carr is a 61 y.o. male with PMHx of asthma, obesity, GEENA with noncompliance, CAD status post CABG, former tobacco abuse who presents to [address] [place] [address] with abdominal pain. Patient reports that this started last night suddenly and was described as a sharp pain in the right upper quadrant which radiated along his costal margin to the right posteriorly. Nothing made it better or worse, he reports a poor appetite and did not eat much during this time. He had some nausea but denied any vomiting. Admitted to loose stools. He reports that he had similar symptoms about a week ago which lasted 2 to 3 days and spontaneously resolved on their own. During that time he had dark/orange urine. He denies any associated fevers or chills, shortness of breath, chest pain or palpitations. He has chronic dyspnea on exertion that he attributes to his asthma however this is not become any worse.     The patient is a former tobacco user. He had a CABG in 2013 and is maintained on aspirin and bisoprolol/metoprolol and is currently followed by his PCP, has not seen a cardiologist in some time. Denies any anginal chest pain. He also has chronic back pain and prior surgery, maintained on MS Contin 30 mg 3 times daily, Norco  as needed, Lyrica, and Mobic by his PCP. He does not drink any alcohol. Subjective (past 24 hours): Patient seen at bedside, stable this morning. Anxious for surgery. No new issues or concerns. No wheezing, SOB, chest pain, worsening abdominal pain, nausea or vomiting.        Medications:  Reviewed    Infusion Medications    sodium chloride 150 mL/hr at 04/08/22 0634    dextrose      sodium chloride 20 mL/hr at 04/07/22 1521     Scheduled Medications    mometasone-formoterol  2 puff Inhalation BID    piperacillin-tazobactam (ZOSYN) 3375 mg in dextrose 5% IVPB extended infusion (mini-bag)  3,375 mg IntraVENous Q8H    insulin lispro  0-6 Units SubCUTAneous Q6H    [Held by provider] atorvastatin  40 mg Oral Daily    DULoxetine 30 mg Oral Daily    [Held by provider] HYDROcodone-acetaminophen  1 tablet Oral TID    metoprolol tartrate  25 mg Oral BID    montelukast  10 mg Oral Nightly    morphine  30 mg Oral TID    pantoprazole  40 mg IntraVENous Daily    pregabalin  200 mg Oral TID    risperiDONE  1 mg Oral Daily    [Held by provider] alogliptin  12.5 mg Oral Daily    tamsulosin  0.4 mg Oral Nightly    sodium chloride flush  5-40 mL IntraVENous 2 times per day    [Held by provider] enoxaparin  40 mg SubCUTAneous Q12H     PRN Meds: glucagon (rDNA), dextrose, glucose, dextrose bolus (hypoglycemia) **OR** dextrose bolus (hypoglycemia), albuterol, sodium chloride flush, sodium chloride, polyethylene glycol, acetaminophen **OR** acetaminophen, HYDROmorphone **OR** HYDROmorphone, potassium chloride **OR** potassium alternative oral replacement **OR** potassium chloride, magnesium sulfate, bisacodyl      ROS: reviewed from prior note, full ROS unchanged unless otherwise stated in hospital course/subjective portion. Intake/Output Summary (Last 24 hours) at 4/8/2022 1636  Last data filed at 4/8/2022 1505  Gross per 24 hour   Intake 1300 ml   Output 1135 ml   Net 165 ml       Exam:  /86   Pulse 110   Temp 97.7 °F (36.5 °C) (Oral)   Resp 18   Ht 5' 8\" (1.727 m)   Wt 266 lb 12.8 oz (121 kg)   SpO2 95%   BMI 40.57 kg/m²        General appearance: Obese, mild distress  Eyes:  Pupils equal, round, and reactive to light. Conjunctivae/corneas clear. HENT: Head normal in appearance. External nares normal.  Oral mucosa Dry without lesions. Hearing grossly intact. Neck: Supple, with full range of motion. Trachea midline. No gross JVD appreciated. Respiratory:  Normal respiratory effort. Clear to auscultation, bilaterally without rales or wheezes or rhonchi. Diminished overall. Cardiovascular: Normal rate, regular rhythm with normal S1/S2 without murmurs. No lower extremity edema. Abdomen: Normoactive bs.  Protuberant, there is pain to palpation in the RUQ (stable). No rebound. Mild distension. Hepatomegaly noted below the right costal margin. Musculoskeletal: There is no joint swelling or tenderness. Normal tone. No abnormal movements. Skin: Warm and dry. Violaceous macules of the bilateral shins anteriorly. Neurologic:  No focal sensory/motor deficits in the upper and lower extremities. Cranial nerves:  grossly non-focal 2-12. Psychiatric: Alert and oriented, normal insight and thought content. Capillary Refill: Brisk,< 3 seconds. Peripheral Pulses: +2 palpable, equal bilaterally. Labs:   Recent Labs     04/06/22  1240 04/07/22  0557   WBC 11.3* 8.1   HGB 19.7* 17.3   HCT 61.0* 54.1*    149     Recent Labs     04/06/22  1240 04/06/22  1240 04/07/22  0557 04/08/22  0608 04/08/22  0921     --  139 133*  --    K 3.8   < > 4.1  4.1 5.0  --    CL 95*  --  103 99  --    CO2 24  --  22* 20*  --    BUN 9  --  9 9  --    CREATININE 0.8  --  0.6  --  0.7   CALCIUM 10.3  --  9.1  --  8.7    < > = values in this interval not displayed. Recent Labs     04/06/22  1240 04/07/22  0557 04/08/22  0608 04/08/22  0921   * 261* 197*  --    * 320* 311*  --    BILIDIR 1.9* 4.4*  --   --    BILITOT 2.9* 5.1*  --  3.5*   ALKPHOS 191* 198* 210*  --      No results for input(s): INR in the last 72 hours. No results for input(s): Shellia Bugler in the last 72 hours. Recent Labs     04/06/22  1524   PROCAL 0.13*      Lab Results   Component Value Date    NITRU NEGATIVE 04/06/2022    WBCUA NONE SEEN 04/06/2022    BACTERIA NONE SEEN 04/06/2022    RBCUA 0-2 04/06/2022    BLOODU NEGATIVE 04/06/2022    GLUCOSEU >= 1000 04/06/2022       Radiology (48 hours):  CT ABDOMEN PELVIS W IV CONTRAST Additional Contrast? None    Result Date: 4/6/2022  1.  Marked gallbladder distention with high density sludge/stones layering within the gallbladder and mild pericholecystic edema suggests acute cholecystitis in the appropriate clinical setting. No biliary ductal dilatation is observed. Correlate with liver function tests. 2. Normal appendix. Colonic diverticulosis without diverticulitis. Moderate retained fecal material seen throughout the colon suggesting fecal stasis. No bowel obstruction. 3. Chronic findings are discussed. **This report has been created using voice recognition software. It may contain minor errors which are inherent in voice recognition technology. ** Final report electronically signed by Dr Jerri Nicolas on 4/6/2022 2:19 PM    MRI ABDOMEN W WO CONTRAST MRCP    Result Date: 4/7/2022  1. Tiny biliary calculi are seen in the common bile duct relating to choledocholithiasis. 2. Marked gallbladder distention with gallstones and sludge with pericholecystic fluid. Findings are suspicious for acute cholecystitis. 3. Marked hepatomegaly. Hepatic steatosis. 4. Other findings as described above. **This report has been created using voice recognition software. It may contain minor errors which are inherent in voice recognition technology. ** Final report electronically signed by Dr Nicole Rai on 4/7/2022 11:20 AM    [address] [place] [address]    Result Date: 4/6/2022  1. Marked gallbladder distention with pericholecystic fluid, gallbladder debris and sludge. Findings likely reflect acute cholecystitis. 2. Hepatomegaly. Hepatic steatosis. **This report has been created using voice recognition software. It may contain minor errors which are inherent in voice recognition technology. ** Final report electronically signed by Dr Nicole Rai on 4/6/2022 4:19 PM       DVT prophylaxis:    [x] Lovenox - hold periop, resume when OK with surgery  [] SCDs  [] SQ Heparin  [] Encourage ambulation   [] Already on Anticoagulation       Diet: ADULT DIET;  Clear Liquid; 4 carb choices (60 gm/meal)  Diet NPO Exceptions are: Sips of Water with Meds  Code Status: Full Code  PT/OT: tbd  IVF: yes    Electronically signed by Asiya Brambila DO on 4/8/2022 at 4:36 PM

## 2022-04-08 NOTE — FLOWSHEET NOTE
04/08/22 1500   Patient Goal of the Day(Whiteboard)   Patient Daily Goal reviewed with Patient;Spouse   Patient's reason for admission Acue Cholecystitis   Precautions   Precautions Fall risk   Negative Pressure Room No   Positive Pressure Room No   Safe Environment   Arm Bands On ID; Allergies   Patient has limb restriction? No   Overbed Table Within Reach Yes   Safety Measures Bed/Chair alarm on;Bed/Chair-Wheels locked; Bed in low position;Caregiver at bedside   Fall Risk Interventions   Toilet Every 2 Hours-In Advance of Need Yes   Hourly Visual Checks Awake; In bed   Fall Visual Posted Fall sign posted   Room Door Open Yes   Alarm On Bed   Mobility   Activity In bed   Level of Assistance Contact guard assist, steading assist   Assistive Device Cane   Distance Ambulated (ft) 10 ft   Ambulation Response Tolerated well   Patient Turned Turns self   Head of Bed Elevated  Self regulated   Heels/Feet Heel(s) elevated off bed; Foot of bed elevated   Range of Motion Active; All extremities   Location Change/Back on Floor Yes   Transport Method Bed   Anti-Embolism Devices Bilateral;Pneumatic compression devices, below knee   Anti-Embolism Intervention Off (Comment)   Pain Assessment   Pain Assessment 0-10   Pain Level 7   Patient's Stated Pain Goal No pain   Pain Type Acute pain   Pain Location Abdomen   Pain Orientation Right;Left   Pain Descriptors Sharp   Pain Frequency Intermittent   Pain Onset Gradual   Clinical Progression Not changed   Non-Pharmaceutical Pain Intervention(s) Cold applied;Relaxation techniques;Repositioned; Rest       Pt admitted to  4K26 From PACU. Complaints: Shortness of breath and pain. IV normal saline infusing into the forearm right, condition patent and no redness at a rate of 100 mls/ hour with about 300 mls in the bag still. IV site free of s/s of infection or infiltration. Vital signs obtained. Assessment and data collection initiated.  Two nurse skin assessment performed by [facility] and [facility]. Oriented to room. Policies and procedures for 4K explained. All questions answered with no further questions at this time. Fall prevention and safety brochure discussed with patient. Bed alarm on. Call light in reach. Patient is jaundiced, diminished lung sounds, heart sounds regular and strong, patient is warm and dry. Patient is assisted to urinal and voids, PADMINI is emptied and site around PADMINI is cleansed and dressed due to sang drainage.

## 2022-04-08 NOTE — OP NOTE
[address] [address]  Operative Report    PATIENT NAME: Arely Elkins  MEDICAL RECORD NO. [id number]  SURGEON: Elaina Sarmiento MD   Primary Care Physician: Darian Horn DO  Date: 4/8/2022, 12:20 PM     PROCEDURE PERFORMED: Robotic assisted laparoscopic cholecystectomy with ICG green cholangiography  PREOPERATIVE DIAGNOSIS:   Active Hospital Problems    Diagnosis Date Noted    Acute cholecystitis [K81.0] 04/06/2022   Acute calculus cholecystitis possible choledocholithiasis  POSTOPERATIVE DIAGNOSIS: Same  SURGEON:  Elaina Sarmiento MD   ANESTHESIA:  General endotracheal anesthesia and local  ANESTHESIA:  20 ml OF 0.5% Marcaine   ESTIMATED BLOOD LOSS:  50  ml  SPECIMEN: Gallbladder with bile and small stones suctioned out of gallbladder  COMPLICATIONS: None immediate DRAINS: (1) 23 Niko drain(s) in the right upper quadrant  DISPOSITION: Recovery Room  CONDITION: stable    Indications: Mr. Julito Alas is a 29-year-old male with history of coronary artery disease, diabetes mellitus who presented with right upper quadrant pain. Imaging studies showed a massively distended gallbladder with stones and sludge. He had some elevated liver function test which progressed and an MRCP revealed possible small stones within the common bile duct. There really was not biliary duct dilation. GI was consulted as well and opted to proceed with cholecystectomy with possible intraoperative cholangiogram and then ERCP as needed postop. Risks of cholecystectomy including but not limited to bleeding, infection, bile duct leak, bile duct injury as well as conversion to open procedure were all discussed with Mr. Julito Alas. All questions answered. Potential for cardiac or pulmonary complications discussed as well. Patient was seen and evaluated by cardiology preoperatively had an echocardiogram and was deemed stable to undergo surgical intervention. Intraoperative findings.   Massively distended gallbladder at least 15 cm in length with thick sludge and many many small stones. ICG green cholangiography performed. Procedure: Patient was brought to the operating suite and placed supine on the operating table. He had pneumatic sequential compression devices placed on the lower extremities. He was given Zosyn preoperatively as well as ICG green dye intravenously. After induction of general anesthesia abdomen was clipped prepped and draped. With chemical paralysis and anesthesia the gallbladder was easily palpable in the right upper quadrant through the abdominal wall. Skin incision was made after timeout was performed. Initial incision was made below the umbilicus dissection was carried down to the fascia which was elevated and incised. A 12 mm XL port was inserted and CO2 pneumoperitoneum was introduced. A reducer was placed. Additional 8 mm ports were placed on the right and the left side of the abdomen superior to the umbilicus secondary to patient's body habitus in the mid axillary line. A right upper quadrant assistant port was placed. Patient was placed in reverse Trendelenburg with the left side down. The gallbladder was aspirated and 300 mL of thick bile was suctioned from the gallbladder to even allow grasping the gallbladder and still had copious amounts of bile within the gallbladder. Robot was docked from the patient's right side and instruments were inserted. I retired to the console. My assistant grasped the gallbladder retracted it superiorly. Omental adhesions were taken down from the infundibulum of the gallbladder. The whole procedure took about twice as long as normal due to the large size of the gallbladder. Cystic artery was identified as well as a posterior branch clipped proximally distally and divided between hemolock clips. Cystic duct was dissected down margin free from surrounding tissue seeing at as a single structure going to the gallbladder.   It was somewhat dilated to the point where it was taken we did not have a clipping device that would hold a cholangiocatheter in place. Decision was made not to proceed with cholangiogram and will discuss with GI postop about ERCP and timing versus watching labs if remain elevated will need to proceed with ERCP. Once the cystic duct was cleared from surrounding tissues and seen as a single structure going to the gallbladder it was clipped proximally and distally with hemoclips and divided. The gallbladder was dissected off the liver bed using cautery technique. During the course of the procedure the grasper did make a hole in the gallbladder there was spillage of bile and stones which were copiously irrigated and suctioned out of the right upper quadrant at the end of the procedure after all gallbladder attachments from the liver were taken down. There was no bleeding seen from the liver bed. Instruments were removed robot was undocked. Gallbladder was placed in an Endo Catch type bag after it scrubbed back in and removed from the abdominal cavity. Even decompressed of all stones and sludge the gallbladder was 16 cm in length. Redocked the robot then inserted instruments. I retired to the console further irrigation of the right upper quadrant was completed. [address] [place] drain was placed in the right upper quadrant and the 5 mm assistant port and secured to skin using a silk suture. Robotic instruments were removed I scrubbed back into the patient's bedside robot again undocked and ports removed. The infra medical fascia was closed with 0 Ethibond suture. All skin incisions were closed in a subcuticular fashion with 3-0 and 4-0 Vicryl suture. Skin glue was applied. Sponge sharp and instrument counts were correct. I discussed the case with Dr. Lisa Segura postop decision made to monitor patient's liver functions if remain elevated we will proceed with ERCP in the future. Patient transported to the recovery room hemodynamically stable.

## 2022-04-08 NOTE — ANESTHESIA PRE PROCEDURE
Department of Anesthesiology  Preprocedure Note       Name:  Aurora Alcazar   Age:  61 y.o.  :  1965                                          MRN:  [id number]         Date:  2022      Surgeon: Chelsea Beckman):  Juhi Martínez MD    Procedure: Procedure(s):  Robotic Laparoscopic Cholecystectomy with Cholangiograms possible Open    Medications prior to admission:   Prior to Admission medications    Medication Sig Start Date End Date Taking? Authorizing Provider   metoprolol succinate (TOPROL XL) 50 MG extended release tablet Take 50 mg by mouth daily   Yes Historical Provider, MD   fluticasone-umeclidin-vilant (TRELEGY ELLIPTA) 100-62.5-25 MCG/INH AEPB Inhale 1 puff into the lungs daily   Yes Historical Provider, MD   fluticasone (FLONASE) 50 MCG/ACT nasal spray 1 spray by Each Nostril route daily   Yes Historical Provider, MD   Dulaglutide (TRULICITY SC) Inject into the skin once a week Patient takes each  evening   Yes Historical Provider, MD   glimepiride (AMARYL) 4 MG tablet Take 4 mg by mouth every morning (before breakfast)   Yes Historical Provider, MD   risperiDONE (RISPERDAL) 1 MG tablet Take 1 mg by mouth daily   Yes Historical Provider, MD   DULoxetine (CYMBALTA) 30 MG extended release capsule Take 30 mg by mouth daily   Yes Historical Provider, MD   HYDROcodone-acetaminophen (NORCO)  MG per tablet Take 1 tablet by mouth in the morning, at noon, and at bedtime. Yes Historical Provider, MD   pregabalin (LYRICA) 200 MG capsule Take 200 mg by mouth in the morning, at noon, and at bedtime.    Yes Historical Provider, MD   montelukast (SINGULAIR) 10 MG tablet Take 10 mg by mouth nightly   Yes Historical Provider, MD   tamsulosin (FLOMAX) 0.4 MG capsule Take 0.4 mg by mouth at bedtime    Yes Historical Provider, MD   atorvastatin (LIPITOR) 40 MG tablet Take 40 mg by mouth nightly    Yes Historical Provider, MD   pantoprazole (PROTONIX) 40 MG tablet Take 40 mg by mouth 2 times daily    Yes Historical Provider, MD   bisoprolol (ZEBETA) 5 MG tablet Take 5 mg by mouth at bedtime    Yes Historical Provider, MD   SITagliptin (JANUVIA) 100 MG tablet Take 100 mg by mouth at bedtime    Yes Historical Provider, MD   LORazepam (ATIVAN) 0.5 MG tablet Take 0.5 mg by mouth at bedtime. Yes Historical Provider, MD   meloxicam (MOBIC) 15 MG tablet Take 15 mg by mouth daily   Yes Historical Provider, MD   ketoconazole (NIZORAL) 2 % shampoo Apply topically daily as needed for Itching Apply topically daily as needed. Yes Historical Provider, MD   desonide (DESOWEN) 0.05 % cream Apply topically 2 times daily Apply topically 2 times daily. Yes Historical Provider, MD   ipratropium (ATROVENT) 0.03 % nasal spray 2 sprays by Each Nostril route daily   Yes Historical Provider, MD   aspirin 81 MG chewable tablet Take 81 mg by mouth daily   Yes Historical Provider, MD   morphine (MS CONTIN) 30 MG extended release tablet Take 30 mg by mouth in the morning, at noon, and at bedtime.  Morning, noon and bedtime   Yes Historical Provider, MD   dapagliflozin (FARXIGA) 10 MG tablet Take 10 mg by mouth every morning  Patient not taking: Reported on 4/6/2022    Historical Provider, MD       Current medications:    Current Facility-Administered Medications   Medication Dose Route Frequency Provider Last Rate Last Admin    mometasone-formoterol (DULERA) 200-5 MCG/ACT inhaler 2 puff  2 puff Inhalation BID Mj Quang, DO        0.9 % sodium chloride infusion   IntraVENous Continuous Fany Decant,  mL/hr at 04/08/22 0634 New Bag at 04/08/22 0634    piperacillin-tazobactam (ZOSYN) 3,375 mg in dextrose 5 % 50 mL IVPB extended infusion (mini-bag)  3,375 mg IntraVENous Q8H Mj Quang, DO   Stopped at 04/08/22 0347    insulin lispro (HUMALOG) injection vial 0-6 Units  0-6 Units SubCUTAneous Q6H Mj Quang, DO   1 Units at 04/08/22 0855    glucagon (rDNA) injection 1 mg  1 mg IntraMUSCular PRN Mj Quang, DO        dextrose 5 % solution  100 mL/hr IntraVENous PRN Riky Telles, DO        0.9 % sodium chloride bolus  1,000 mL IntraVENous Once Riky Telles DO        glucose chewable tablet 4 each  4 tablet Oral PRN Riky Telles, DO        dextrose bolus (hypoglycemia) 10% 125 mL  125 mL IntraVENous PRN Riky Telles DO        Or    dextrose bolus (hypoglycemia) 10% 250 mL  250 mL IntraVENous PRN Riky Telles, DO        albuterol (PROVENTIL) nebulizer solution 5 mg  5 mg Nebulization Q4H PRN Riky Telles DO        [Held by provider] atorvastatin (LIPITOR) tablet 40 mg  40 mg Oral Daily Riky Telles DO        DULoxetine (CYMBALTA) extended release capsule 30 mg  30 mg Oral Daily Riky Telles, DO   30 mg at 04/08/22 0851    [Held by provider] HYDROcodone-acetaminophen (NORCO)  MG per tablet 1 tablet  1 tablet Oral TID Riky Telles,         metoprolol tartrate (LOPRESSOR) tablet 25 mg  25 mg Oral BID Riky Telles, DO   25 mg at 04/08/22 0851    montelukast (SINGULAIR) tablet 10 mg  10 mg Oral Nightly Riyk Telles, DO   10 mg at 04/07/22 2059    morphine (MS CONTIN) extended release tablet 30 mg  30 mg Oral TID Riky Telles, DO   30 mg at 04/08/22 0631    pantoprazole (PROTONIX) injection 40 mg  40 mg IntraVENous Daily Osteen Elfego, DO   40 mg at 04/08/22 0853    pregabalin (LYRICA) capsule 200 mg  200 mg Oral TID Riky Telles, DO   200 mg at 04/08/22 0084    risperiDONE (RISPERDAL) tablet 1 mg  1 mg Oral Daily Riky Telles, DO   1 mg at 04/08/22 7061    [Held by provider] alogliptin (NESINA) tablet 12.5 mg  12.5 mg Oral Daily Riky Telles DO        tamsulosin [facility]) capsule 0.4 mg  0.4 mg Oral Nightly Riky Telles, DO   0.4 mg at 04/07/22 2059    sodium chloride flush 0.9 % injection 5-40 mL  5-40 mL IntraVENous 2 times per day Riky Telles, DO   10 mL at 04/07/22 2059    sodium chloride flush 0.9 % injection 5-40 mL  5-40 mL IntraVENous PRN Riky Telles DO        0.9 % sodium chloride infusion   IntraVENous PRN Riky Telles, DO 20 mL/hr at 04/07/22 1521 New Bag at 04/07/22 1521    polyethylene glycol (GLYCOLAX) packet 17 g  17 g Oral Daily PRN Pleas Deeds, DO        acetaminophen (TYLENOL) tablet 650 mg  650 mg Oral Q6H PRN Pleas Deeds, DO        Or    acetaminophen (TYLENOL) suppository 650 mg  650 mg Rectal Q6H PRN Pleas Deeds, DO        HYDROmorphone (DILAUDID) injection 1 mg  1 mg IntraVENous Q3H PRN Pleas Deeds, DO   1 mg at 04/07/22 1831    Or    HYDROmorphone (DILAUDID) injection 1.5 mg  1.5 mg IntraVENous Q3H PRN Pleas Deeds, DO        potassium chloride (KLOR-CON M) extended release tablet 40 mEq  40 mEq Oral PRN Pleas Deeds, DO        Or    potassium bicarb-citric acid (EFFER-K) effervescent tablet 40 mEq  40 mEq Oral PRN Pleas Deeds, DO        Or    potassium chloride 10 mEq/100 mL IVPB (Peripheral Line)  10 mEq IntraVENous PRN Pleas Deeds, DO        magnesium sulfate 2000 mg in 50 mL IVPB premix  2,000 mg IntraVENous PRN Pleas Deeds, DO        bisacodyl (DULCOLAX) suppository 10 mg  10 mg Rectal Daily PRN Pleas Deeds, DO        [Held by provider] enoxaparin (LOVENOX) injection 40 mg  40 mg SubCUTAneous Q12H Alva Hong MD   40 mg at 04/06/22 2056       Allergies: Allergies   Allergen Reactions    Abilify [Aripiprazole]     Metformin And Related        Problem List:    Patient Active Problem List   Diagnosis Code    Acute cholecystitis K81.0       Past Medical History:        Diagnosis Date    CAD (coronary artery disease)     [facility]     Hyperlipidemia     Hypertension     Movement disorder     Neuromuscular disorder ([facility]     Pneumonia     Psychiatric problem        Past Surgical History:        Procedure Laterality Date    BACK SURGERY      CARDIAC SURGERY      COLONOSCOPY      ENDOSCOPY, COLON, DIAGNOSTIC         Social History:    Social History     Tobacco Use    Smoking status: Never Smoker    Smokeless tobacco: Never Used   Substance Use Topics    Alcohol use:  Yes Comment: \"not very often\"                                Counseling given: Not Answered      Vital Signs (Current):   Vitals:    04/07/22 2344 04/08/22 0345 04/08/22 0744 04/08/22 0830   BP: 113/68 122/77  127/79   Pulse: 74 75  78   Resp: 14 14 16 18   Temp: 98.1 °F (36.7 °C) 98.1 °F (36.7 °C)  98.1 °F (36.7 °C)   TempSrc: Oral Oral  Oral   SpO2: 95% 98% 96% 96%   Weight:       Height:                                                  BP Readings from Last 3 Encounters:   04/08/22 127/79       NPO Status:                                                                                 BMI:   Wt Readings from Last 3 Encounters:   04/06/22 270 lb (122.5 kg)     Body mass index is 41.05 kg/m².     CBC:   Lab Results   Component Value Date    WBC 8.1 04/07/2022    RBC 5.95 04/07/2022    HGB 17.3 04/07/2022    HCT 54.1 04/07/2022    MCV 90.9 04/07/2022     04/07/2022       CMP:   Lab Results   Component Value Date     04/08/2022    K 5.0 04/08/2022    K 4.1 04/07/2022    CL 99 04/08/2022    CO2 20 04/08/2022    BUN 9 04/08/2022    CREATININE 0.6 04/07/2022    LABGLOM >90 04/07/2022    GLUCOSE 132 04/08/2022    PROT 6.6 04/08/2022    CALCIUM 9.1 04/07/2022    BILITOT 5.1 04/07/2022    ALKPHOS 210 04/08/2022     04/08/2022     04/08/2022       POC Tests:   Recent Labs     04/08/22  0850   POCGLU 142*       Coags: No results found for: PROTIME, INR, APTT    HCG (If Applicable): No results found for: PREGTESTUR, PREGSERUM, HCG, HCGQUANT     ABGs: No results found for: PHART, PO2ART, TOL3JWW, YPZ3SZQ, BEART, F5QCLWUT     Type & Screen (If Applicable):  No results found for: LABABO, LABRH    Drug/Infectious Status (If Applicable):  No results found for: HIV, HEPCAB    COVID-19 Screening (If Applicable): No results found for: COVID19        Anesthesia Evaluation  Patient summary reviewed and Nursing notes reviewed  Airway: Mallampati: III        Dental:          Pulmonary: breath sounds clear to auscultation  (+) sleep apnea:                             Cardiovascular:  Exercise tolerance: good (>4 METS),   (+) hypertension:, CAD:,       ECG reviewed  Rhythm: regular  Rate: normal                    Neuro/Psych:   (+) neuromuscular disease:, psychiatric history:            GI/Hepatic/Renal:             Endo/Other:    (+) Diabetes, . Abdominal:       Abdomen: soft. Vascular: Other Findings:             Anesthesia Plan      general     ASA 3       Induction: intravenous. MIPS: Postoperative opioids intended and Prophylactic antiemetics administered. Anesthetic plan and risks discussed with patient and spouse. Plan discussed with CRNA.                   [address] [facility],    4/8/2022

## 2022-04-08 NOTE — BRIEF OP NOTE
Brief Postoperative Note      Patient: Demetris Coy  YOB: 1965  MRN: [id number]    Date of Procedure: 4/8/2022    Pre-Op Diagnosis: Gallbladder distention Gallstones cholecystitis    Post-Op Diagnosis: Same       Procedure(s):  Robotic Laparoscopic Cholecystectomy    Surgeon(s):  Rosa Maria Motta MD    Assistant:  First Assistant: Carole Forrester RN    Anesthesia: General    Estimated Blood Loss (mL): less than 50     Complications: None    Specimens:   ID Type Source Tests Collected by Time Destination   A : Gallbladder Tissue Gallbladder SURGICAL PATHOLOGY Rosa Maria Motta MD 4/8/2022 1139        Implants:  * No implants in log *      Drains:   Closed/Suction Drain Lateral RLQ Bulb [address] [place] (Active)       Findings: Massively distended gallbladder with bile and many many small stones.   Intraoperative cholangiogram technically not feasible    Electronically signed by Rosa Maria Motta MD on 4/8/2022 at 12:15 PM

## 2022-04-08 NOTE — CARE COORDINATION
Collaborative Discharge Planning    Debbie Olmos  :  1965  MRN:  [id number]    ADMIT DATE:  2022      Discharge Planning    White Board Notes /Social Work Whiteboard Notes  /Social Work Whiteboard:  CM: Plans to return home with wife, has cane. Denies needs. Procedure    Robotic Laparoscopic Cholecystectomy       Discharge Plan Home with family    Discharge Milestones and Delays: Clinical status, pain control. Await advancement of diet. Anticipate home this weekend if no complications arise.          SIGNED:  Laine Smith RN   2022, 1:24 PM

## 2022-04-08 NOTE — PLAN OF CARE
Problem: Pain:  Description: Pain management should include both nonpharmacologic and pharmacologic interventions. Goal: Pain level will decrease  Description: Pain level will decrease  Outcome: Met This Shift  Note: Pt report pain at 6 on scale. Pt states oral medication helping to achieve pain goal of a 2 on scale. Problem: Pain:  Description: Pain management should include both nonpharmacologic and pharmacologic interventions. Goal: Control of acute pain  Description: Control of acute pain  Outcome: Ongoing     Problem: Pain:  Description: Pain management should include both nonpharmacologic and pharmacologic interventions.   Goal: Control of chronic pain  Description: Control of chronic pain  Outcome: Ongoing     Problem: Skin Integrity:  Goal: Will show no infection signs and symptoms  Description: Will show no infection signs and symptoms  Outcome: Met This Shift  Note: No S/S of infection noted during shift; pt on antibiotics     Problem: Skin Integrity:  Goal: Absence of new skin breakdown  Description: Absence of new skin breakdown  Outcome: Met This Shift  Note: No new skin breakdown noted; pt up ad imtiaz     Problem: Falls - Risk of:  Goal: Will remain free from falls  Description: Will remain free from falls  Outcome: Met This Shift  Note: Pt free from falls during shift; uses call light appropriately

## 2022-04-08 NOTE — PROGRESS NOTES
Gastroenterology Progress Note:     Patient Name:  Aisha Washburn   MRN: [id number]  [id number]  YOB: 1965  Admit Date: 4/6/2022 12:19 PM  Primary Care Physician: Rosmery Ascencio DO   [id number]     Patient seen and examined. 24 hours events and chart reviewed. Subjective: Patient sitting up in the chair, wife present. He continues to have RUQ pain. Denies n/v. He is to have cholecystectomy with IOC this morning. Objective:  /79   Pulse 78   Temp 98.1 °F (36.7 °C) (Oral)   Resp 18   Ht 5' 8\" (1.727 m)   Wt 270 lb (122.5 kg)   SpO2 96%   BMI 41.05 kg/m²     Physical Exam:    General:  Acutely ill appearing male  HEENT: Atraumatic, normocephalic. Moist oral mucous membranes. Neck: Supple without adenopathy, JVD, thyromegaly or masses. Trachea midline. CV: Heart RRR, no murmurs, rubs, gallops. Resp: Even, easy without cough or accessory use. Lungs clear to ascultation bilaterally. Abd: Round, soft, obese, tender to RUQ with palpation. Hepatomegaly. Active bowel sounds heard. No distention noted. Ext:  Without cyanosis, clubbing, edema. Skin: Pink, warm, dry  Neuro:  Alert, oriented x 3 with no obvious deficits.        Rectal: deferred    Labs:   CBC:   Lab Results   Component Value Date    WBC 8.1 04/07/2022    HGB 17.3 04/07/2022    HCT 54.1 04/07/2022    MCV 90.9 04/07/2022     04/07/2022     BMP:   Lab Results   Component Value Date     04/08/2022    K 5.0 04/08/2022    K 4.1 04/07/2022    CL 99 04/08/2022    CO2 20 04/08/2022    BUN 9 04/08/2022    CREATININE 0.6 04/07/2022    CALCIUM 9.1 04/07/2022     PT/INR: No results found for: PROTIME, INR  Lipids:   Lab Results   Component Value Date    ALKPHOS 210 04/08/2022     04/08/2022     04/08/2022    BILITOT 5.1 04/07/2022    BILIDIR 4.4 04/07/2022    LABALBU 3.6 04/08/2022    LIPASE 49.1 04/06/2022     Significant Diagnostic Studies: none the last 24 hours    Current Meds:  Scheduled Meds:   mometasone-formoterol  2 puff Inhalation BID    piperacillin-tazobactam (ZOSYN) 3375 mg in dextrose 5% IVPB extended infusion (mini-bag)  3,375 mg IntraVENous Q8H    insulin lispro  0-6 Units SubCUTAneous Q6H    sodium chloride  1,000 mL IntraVENous Once    [Held by provider] atorvastatin  40 mg Oral Daily    DULoxetine  30 mg Oral Daily    [Held by provider] HYDROcodone-acetaminophen  1 tablet Oral TID    metoprolol tartrate  25 mg Oral BID    montelukast  10 mg Oral Nightly    morphine  30 mg Oral TID    pantoprazole  40 mg IntraVENous Daily    pregabalin  200 mg Oral TID    risperiDONE  1 mg Oral Daily    [Held by provider] alogliptin  12.5 mg Oral Daily    tamsulosin  0.4 mg Oral Nightly    sodium chloride flush  5-40 mL IntraVENous 2 times per day    [Held by provider] enoxaparin  40 mg SubCUTAneous Q12H     Continuous Infusions:   sodium chloride 150 mL/hr at 04/08/22 0634    dextrose      sodium chloride 20 mL/hr at 04/07/22 1521       Assessment:  60 yo M admitted 04/06/22 for RUQ pain & nausea. Initial WBC 11.3. LFTs elevated. CT A/P demonstrated marked gallbladder distention with high density sludge/stones layering within the gallbladder & mild pericholecystic edema suggesting acute cholecystitis, no biliary ductal dilatation. US abdomen demonstrated marked gallbladder distention with pericholecystic fluid, gallbladder debris & sludge, hepatomegaly, & hepatic steatosis. MRCP demonstrated tiny biliary calculi in the CBD relating to choledocholithiasis, marked gallbladder distention with gallstones & sludge with pericholecystic fluid, marked hepatomegaly, hepatic steatosis. 1. RUQ pain  2. Nausea without vomiting  3. Acute cholecystitis  4. Choledocholithiasis noted on MRCP  5. Acute hypoxic respiratory failure  6. Elevated LFTs- trending down  7. Hyperbilirubinemia  8. Hepatomegaly  9. Hepatic steatosis  10. Leukocytosis- resolved  11. Asthma, not exacerbated  12. DM  13.  H/O chronic back pain with chronic opioid use  14. GEENA  15. H/O anxiety  16. Morbid obesity      Plan:    · IVF  · Monitor HFP  · Continue ATBs  · NPO   · Hold Lovenox  · Case discussed with general surgery, Dr. Claude Comber who wishes to proceed with cholecystectomy with Koby Garcia Abdi 04/08/22 given the state of the gallbladder. GI is in agreement with this plan  · Await [facility] results  · RN updated  · Supportive care per primary team  Will follow    Case reviewed and impression/plan reviewed in collaboration with Dr. Eleonora Avila  Electronically signed by ANNA Wilcox CNP on 4/8/2022 at 9:25 AM    [facility]     If there are any questions or concerns this weekend, please call Dr. Yann Vazquez as he is covering for [facility].

## 2022-04-08 NOTE — ANESTHESIA POSTPROCEDURE EVALUATION
Department of Anesthesiology  Postprocedure Note    Patient: Arely Elkins  MRN: [id number]  YOB: 1965  Date of evaluation: 4/8/2022  Time:  1:49 PM     Procedure Summary     Date: 04/08/22 Room / Location: [address] [place] [address]    Anesthesia Start: 5108 Anesthesia Stop: 1223    Procedure: Robotic Laparoscopic Cholecystectomy (N/A Abdomen) Diagnosis: (Gallbladder distention Gallstones)    Surgeons: Elaina Sarmiento MD Responsible Provider: Jose G Patiño DO    Anesthesia Type: general ASA Status: 3          Anesthesia Type: general    Guillermo Phase I: Guillermo Score: 8    Guillermo Phase II:      Last vitals: Reviewed and per EMR flowsheets. Anesthesia Post Evaluation    Patient location during evaluation: PACU  Patient participation: complete - patient participated  Level of consciousness: awake  Airway patency: patent  Nausea & Vomiting: no vomiting and no nausea  Complications: no  Cardiovascular status: hemodynamically stable  Respiratory status: acceptable and nasal cannula (patient with underlying GEENA, some hypoxia in PACU requiring 6L 02 via NC to maintian 90%. patient does not tolerate CPAP/BiPAP secondary to anxiety.  Stated we would attempt high flow O2, but may need CPAP or BiPap)  Hydration status: euvolemic

## 2022-04-08 NOTE — PROGRESS NOTES
21  pt arrived to PACU, awakens to voice. OPA removed on arrival. Placed on 6L NC. Oxygen sats increased to 92%. VSS  1228 Oxygen sats 90% on 6L NC. Placed on 15L NRB. Lung sounds clear and diminished  1240 c/o pain 9/10, medicated with 50 mcg fentanyl  1250 BP elevated, medicated with 5 mg labetalol  1259 c/o pain 8/10, medicated with 50 mcg fentanyl  1312 Dr Regino Hernandez at bedside, orders for HFNC  1326 placed on HFNC 60L 70%  1341 c/o pain 8/10, medicated with 50 mcg fentanyl  1346 no change in pain status, medicated with 50 mcg fentanyl  1351 no change in pain status, medicated with 0.5 mg dilaudid  1356 no change in pain status, medicated with 0.5 mg dilaudid  1409 c/o pain 6/10, medicated with 25 mcg fentanyl  1414 no change in pain status, medicated with 25 mcg fentanyl  1419 pt states pain in abdomen \"is better\".  VSS  1434 meets criteria for discharge from PACU, placed transport to [facility]  1450 transported to [facility] in stable condition

## 2022-04-08 NOTE — PROGRESS NOTES
Physician Progress Note      Silke Correa  [facility] #:                  [id number]  :                       1965  ADMIT DATE:       2022 12:19 PM  DISCH DATE:  RESPONDING  PROVIDER #:        Broderick THAKKAR DO          QUERY TEXT:    Patient admitted with cholecystitis. Noted documentation of ?sepsis in H/P and   PN dated  and . In order to support the diagnosis of sepsis, please   include additional clinical indicators in your documentation. Or please   document if the diagnosis of sepsis has been ruled out after further study    The medical record reflects the following:  Risk Factors: cholecystitis  Clinical Indicators: near tachycardia (tachy noted: 90-95/min x 1) secondary   to cholecystitis  WBC 11.3/8.1, LA 2.1, procal 0.13, anion gap 21/14, NO   FEVER, resp WNL)  Treatment: IV hydration and IV antibiotics with Zosyn continued    Thank you. Please call if you have any questions. (P) [phone]. Signed   by Clifford Henderson RN Clinical , CRCR  Options provided:  -- Sepsis confirmed after study and was present on admission  -- Sepsis treated and resolved  -- No Sepsis, localized infection only  -- Sepsis was ruled out  -- Other - I will add my own diagnosis  -- Disagree - Not applicable / Not valid  -- Disagree - Clinically unable to determine / Unknown  -- Refer to Clinical Documentation Reviewer    PROVIDER RESPONSE TEXT:    Sepsis confirmed after study and was present on admission.     Query created by: Terri Warren on 2022 9:37 AM      Electronically signed by:  Scott Oakley DO 2022 3:43 PM

## 2022-04-09 LAB
ALBUMIN SERPL-MCNC: 3.3 G/DL (ref 3.5–5.1)
ALP BLD-CCNC: 202 U/L (ref 38–126)
ALT SERPL-CCNC: 272 U/L (ref 11–66)
ANION GAP SERPL CALCULATED.3IONS-SCNC: 12 MEQ/L (ref 8–16)
AST SERPL-CCNC: 143 U/L (ref 5–40)
BILIRUB SERPL-MCNC: 1.8 MG/DL (ref 0.3–1.2)
BUN BLDV-MCNC: 11 MG/DL (ref 7–22)
CALCIUM SERPL-MCNC: 8.5 MG/DL (ref 8.5–10.5)
CHLORIDE BLD-SCNC: 98 MEQ/L (ref 98–111)
CO2: 21 MEQ/L (ref 23–33)
CREAT SERPL-MCNC: 0.7 MG/DL (ref 0.4–1.2)
ERYTHROCYTE [DISTWIDTH] IN BLOOD BY AUTOMATED COUNT: 14.9 % (ref 11.5–14.5)
ERYTHROCYTE [DISTWIDTH] IN BLOOD BY AUTOMATED COUNT: 50 FL (ref 35–45)
GFR SERPL CREATININE-BSD FRML MDRD: > 90 ML/MIN/1.73M2
GLUCOSE BLD-MCNC: 132 MG/DL (ref 70–108)
GLUCOSE BLD-MCNC: 176 MG/DL (ref 70–108)
GLUCOSE BLD-MCNC: 176 MG/DL (ref 70–108)
GLUCOSE BLD-MCNC: 179 MG/DL (ref 70–108)
GLUCOSE BLD-MCNC: 182 MG/DL (ref 70–108)
GLUCOSE BLD-MCNC: 192 MG/DL (ref 70–108)
HAV IGM SER IA-ACNC: NEGATIVE
HCT VFR BLD CALC: 50.3 % (ref 42–52)
HEMOGLOBIN: 16.3 GM/DL (ref 14–18)
HEPATITIS B CORE IGM ANTIBODY: NEGATIVE
HEPATITIS B SURFACE ANTIGEN: NEGATIVE
HEPATITIS C ANTIBODY: NEGATIVE
MCH RBC QN AUTO: 29.9 PG (ref 26–33)
MCHC RBC AUTO-ENTMCNC: 32.4 GM/DL (ref 32.2–35.5)
MCV RBC AUTO: 92.1 FL (ref 80–94)
PLATELET # BLD: 151 THOU/MM3 (ref 130–400)
PMV BLD AUTO: 10.1 FL (ref 9.4–12.4)
POTASSIUM REFLEX MAGNESIUM: 4.2 MEQ/L (ref 3.5–5.2)
RBC # BLD: 5.46 MILL/MM3 (ref 4.7–6.1)
SODIUM BLD-SCNC: 131 MEQ/L (ref 135–145)
TOTAL PROTEIN: 6.8 G/DL (ref 6.1–8)
WBC # BLD: 9.7 THOU/MM3 (ref 4.8–10.8)

## 2022-04-09 PROCEDURE — 6360000002 HC RX W HCPCS: Performed by: SURGERY

## 2022-04-09 PROCEDURE — 6370000000 HC RX 637 (ALT 250 FOR IP): Performed by: INTERNAL MEDICINE

## 2022-04-09 PROCEDURE — 99233 SBSQ HOSP IP/OBS HIGH 50: CPT | Performed by: INTERNAL MEDICINE

## 2022-04-09 PROCEDURE — 2580000003 HC RX 258: Performed by: SURGERY

## 2022-04-09 PROCEDURE — 99024 POSTOP FOLLOW-UP VISIT: CPT | Performed by: SURGERY

## 2022-04-09 PROCEDURE — 85027 COMPLETE CBC AUTOMATED: CPT

## 2022-04-09 PROCEDURE — 1200000000 HC SEMI PRIVATE

## 2022-04-09 PROCEDURE — 80053 COMPREHEN METABOLIC PANEL: CPT

## 2022-04-09 PROCEDURE — 80074 ACUTE HEPATITIS PANEL: CPT

## 2022-04-09 PROCEDURE — C9113 INJ PANTOPRAZOLE SODIUM, VIA: HCPCS | Performed by: SURGERY

## 2022-04-09 PROCEDURE — 94669 MECHANICAL CHEST WALL OSCILL: CPT

## 2022-04-09 PROCEDURE — 94640 AIRWAY INHALATION TREATMENT: CPT

## 2022-04-09 PROCEDURE — 94760 N-INVAS EAR/PLS OXIMETRY 1: CPT

## 2022-04-09 PROCEDURE — 2700000000 HC OXYGEN THERAPY PER DAY

## 2022-04-09 PROCEDURE — 6370000000 HC RX 637 (ALT 250 FOR IP): Performed by: SURGERY

## 2022-04-09 PROCEDURE — 82948 REAGENT STRIP/BLOOD GLUCOSE: CPT

## 2022-04-09 PROCEDURE — 36415 COLL VENOUS BLD VENIPUNCTURE: CPT

## 2022-04-09 RX ORDER — IPRATROPIUM BROMIDE AND ALBUTEROL SULFATE 2.5; .5 MG/3ML; MG/3ML
1 SOLUTION RESPIRATORY (INHALATION) EVERY 4 HOURS PRN
Status: DISCONTINUED | OUTPATIENT
Start: 2022-04-09 | End: 2022-04-09

## 2022-04-09 RX ADMIN — METOPROLOL TARTRATE 25 MG: 50 TABLET, FILM COATED ORAL at 21:11

## 2022-04-09 RX ADMIN — INSULIN LISPRO 1 UNITS: 100 INJECTION, SOLUTION INTRAVENOUS; SUBCUTANEOUS at 11:49

## 2022-04-09 RX ADMIN — HYDROMORPHONE HYDROCHLORIDE 1 MG: 1 INJECTION, SOLUTION INTRAMUSCULAR; INTRAVENOUS; SUBCUTANEOUS at 09:06

## 2022-04-09 RX ADMIN — HYDROMORPHONE HYDROCHLORIDE 1 MG: 1 INJECTION, SOLUTION INTRAMUSCULAR; INTRAVENOUS; SUBCUTANEOUS at 15:42

## 2022-04-09 RX ADMIN — INSULIN LISPRO 1 UNITS: 100 INJECTION, SOLUTION INTRAVENOUS; SUBCUTANEOUS at 21:32

## 2022-04-09 RX ADMIN — PANTOPRAZOLE SODIUM 40 MG: 40 INJECTION, POWDER, FOR SOLUTION INTRAVENOUS at 09:05

## 2022-04-09 RX ADMIN — MORPHINE SULFATE 30 MG: 30 TABLET, FILM COATED, EXTENDED RELEASE ORAL at 06:29

## 2022-04-09 RX ADMIN — PIPERACILLIN AND TAZOBACTAM 3375 MG: 3; .375 INJECTION, POWDER, LYOPHILIZED, FOR SOLUTION INTRAVENOUS at 06:29

## 2022-04-09 RX ADMIN — NALOXEGOL OXALATE 12.5 MG: 12.5 TABLET, FILM COATED ORAL at 09:05

## 2022-04-09 RX ADMIN — RISPERIDONE 1 MG: 1 TABLET ORAL at 09:05

## 2022-04-09 RX ADMIN — PREGABALIN 200 MG: 75 CAPSULE ORAL at 09:05

## 2022-04-09 RX ADMIN — IPRATROPIUM BROMIDE AND ALBUTEROL SULFATE 1 AMPULE: .5; 3 SOLUTION RESPIRATORY (INHALATION) at 09:37

## 2022-04-09 RX ADMIN — PREGABALIN 200 MG: 75 CAPSULE ORAL at 21:11

## 2022-04-09 RX ADMIN — PREGABALIN 200 MG: 75 CAPSULE ORAL at 15:42

## 2022-04-09 RX ADMIN — ENOXAPARIN SODIUM 40 MG: 100 INJECTION SUBCUTANEOUS at 09:05

## 2022-04-09 RX ADMIN — METOPROLOL TARTRATE 25 MG: 50 TABLET, FILM COATED ORAL at 09:05

## 2022-04-09 RX ADMIN — PIPERACILLIN AND TAZOBACTAM 3375 MG: 3; .375 INJECTION, POWDER, LYOPHILIZED, FOR SOLUTION INTRAVENOUS at 16:56

## 2022-04-09 RX ADMIN — INSULIN LISPRO 1 UNITS: 100 INJECTION, SOLUTION INTRAVENOUS; SUBCUTANEOUS at 17:05

## 2022-04-09 RX ADMIN — SODIUM CHLORIDE, PRESERVATIVE FREE 10 ML: 5 INJECTION INTRAVENOUS at 21:50

## 2022-04-09 RX ADMIN — SODIUM CHLORIDE, PRESERVATIVE FREE 10 ML: 5 INJECTION INTRAVENOUS at 16:53

## 2022-04-09 RX ADMIN — ENOXAPARIN SODIUM 40 MG: 100 INJECTION SUBCUTANEOUS at 21:12

## 2022-04-09 RX ADMIN — IPRATROPIUM BROMIDE AND ALBUTEROL SULFATE 1 AMPULE: .5; 3 SOLUTION RESPIRATORY (INHALATION) at 18:17

## 2022-04-09 RX ADMIN — MORPHINE SULFATE 30 MG: 30 TABLET, FILM COATED, EXTENDED RELEASE ORAL at 14:11

## 2022-04-09 RX ADMIN — MORPHINE SULFATE 30 MG: 30 TABLET, FILM COATED, EXTENDED RELEASE ORAL at 21:11

## 2022-04-09 RX ADMIN — DULOXETINE HYDROCHLORIDE 30 MG: 30 CAPSULE, DELAYED RELEASE ORAL at 09:05

## 2022-04-09 RX ADMIN — IPRATROPIUM BROMIDE AND ALBUTEROL SULFATE 1 AMPULE: .5; 3 SOLUTION RESPIRATORY (INHALATION) at 13:46

## 2022-04-09 RX ADMIN — INSULIN LISPRO 1 UNITS: 100 INJECTION, SOLUTION INTRAVENOUS; SUBCUTANEOUS at 04:07

## 2022-04-09 RX ADMIN — MONTELUKAST SODIUM 10 MG: 10 TABLET, FILM COATED ORAL at 21:35

## 2022-04-09 RX ADMIN — TAMSULOSIN HYDROCHLORIDE 0.4 MG: 0.4 CAPSULE ORAL at 21:35

## 2022-04-09 ASSESSMENT — PAIN DESCRIPTION - PROGRESSION
CLINICAL_PROGRESSION: NOT CHANGED
CLINICAL_PROGRESSION: GRADUALLY WORSENING
CLINICAL_PROGRESSION: NOT CHANGED

## 2022-04-09 ASSESSMENT — PAIN SCALES - GENERAL
PAINLEVEL_OUTOF10: 5
PAINLEVEL_OUTOF10: 4
PAINLEVEL_OUTOF10: 3
PAINLEVEL_OUTOF10: 5
PAINLEVEL_OUTOF10: 5
PAINLEVEL_OUTOF10: 0
PAINLEVEL_OUTOF10: 5
PAINLEVEL_OUTOF10: 4
PAINLEVEL_OUTOF10: 0
PAINLEVEL_OUTOF10: 7

## 2022-04-09 ASSESSMENT — PAIN DESCRIPTION - LOCATION
LOCATION: ABDOMEN
LOCATION: BACK
LOCATION: BACK

## 2022-04-09 ASSESSMENT — PAIN DESCRIPTION - PAIN TYPE
TYPE: CHRONIC PAIN
TYPE: CHRONIC PAIN
TYPE: CHRONIC PAIN;SURGICAL PAIN

## 2022-04-09 ASSESSMENT — PAIN DESCRIPTION - DESCRIPTORS
DESCRIPTORS: ACHING
DESCRIPTORS: ACHING;SHARP

## 2022-04-09 ASSESSMENT — PAIN DESCRIPTION - ONSET: ONSET: GRADUAL

## 2022-04-09 ASSESSMENT — PAIN DESCRIPTION - ORIENTATION
ORIENTATION: LOWER;MID
ORIENTATION: LOWER;MID
ORIENTATION: RIGHT

## 2022-04-09 ASSESSMENT — PAIN DESCRIPTION - FREQUENCY: FREQUENCY: CONTINUOUS

## 2022-04-09 NOTE — PROGRESS NOTES
Cordell Kelly MD  Postoperative Progress Note  Pt Name: Aurora Alcazar  Medical Record Number: [id number]  Date of Birth 1965   Today's Date: 4/9/2022  ASSESSMENT   1. POD # 1 robotic assisted laparoscopic cholecystectomy with ICG green cholangiography  2. Massively distended gallbladder with sludge and many small stones  3. Elevated liver function test resolving  4. Postoperative respiratory insufficiency requiring transfer to stepdown unit. Patient now on nasal cannula appears comfortable   has a past medical history of CAD (coronary artery disease), Diabetes mellitus ([facility] [address] Hyperlipidemia, Hypertension, Movement disorder, Neuromuscular disorder ([facility] [address] Pneumonia, and Psychiatric problem. PLANS   1. Analgesics and antiemetics as needed  2. IV hydration  3. May advance diet as tolerated from a surgery standpoint  4. Increase activity  5. Lovenox for DVT prophylaxis. Encourage ambulation. 6. Can hold off on ERCP as long as liver function tests are normalizing  7. Recommend continue IV antibiotics through the weekend  Niurka Hernandez is doing better. He reports feeling better since cholecystectomy. He was placed on high flow oxygen postop has a history of respiratory insufficiency in the past after surgical procedures. He is now on nasal cannula oxygen. Denies nausea. Tolerating ice chips.   CURRENT MEDICATIONS   Scheduled Meds:   ipratropium-albuterol  1 ampule Inhalation Q4H WA    naloxegol  12.5 mg Oral QAM    [Held by provider] mometasone-formoterol  2 puff Inhalation BID    piperacillin-tazobactam (ZOSYN) 3375 mg in dextrose 5% IVPB extended infusion (mini-bag)  3,375 mg IntraVENous Q8H    insulin lispro  0-6 Units SubCUTAneous Q6H    [Held by provider] atorvastatin  40 mg Oral Daily    DULoxetine  30 mg Oral Daily    [Held by provider] HYDROcodone-acetaminophen  1 tablet Oral TID    metoprolol tartrate  25 mg Oral BID    montelukast  10 mg Oral Nightly    morphine  30 mg Oral TID    pantoprazole  40 mg IntraVENous Daily    pregabalin  200 mg Oral TID    risperiDONE  1 mg Oral Daily    [Held by provider] alogliptin  12.5 mg Oral Daily    tamsulosin  0.4 mg Oral Nightly    sodium chloride flush  5-40 mL IntraVENous 2 times per day    enoxaparin  40 mg SubCUTAneous Q12H     Continuous Infusions:   sodium chloride 75 mL/hr at 22 0906    dextrose      sodium chloride Stopped (22 2105)     PRN Meds:.glucagon (rDNA), dextrose, glucose, dextrose bolus (hypoglycemia) **OR** dextrose bolus (hypoglycemia), albuterol, sodium chloride flush, sodium chloride, polyethylene glycol, acetaminophen **OR** acetaminophen, HYDROmorphone **OR** HYDROmorphone, potassium chloride **OR** potassium alternative oral replacement **OR** potassium chloride, magnesium sulfate, bisacodyl  OBJECTIVE   CURRENT VITALS:  height is 5' 8\" (1.727 m) and weight is 266 lb 12.8 oz (121 kg). His oral temperature is 97.3 °F (36.3 °C). His blood pressure is 132/78 and his pulse is 77. His respiration is 18 and oxygen saturation is 94%. Body mass index is 40.57 kg/m².   Temperature Range (24h):Temp: 97.3 °F (36.3 °C) Temp  Av °F (36.7 °C)  Min: 97.3 °F (36.3 °C)  Max: 98.8 °F (37.1 °C)  BP Range (98J): Systolic (91TZG), BIO:004 , Min:99 , OJX:114     Diastolic (23TUY), CU, Min:51, Max:101    Pulse Range (24h): Pulse  Av.3  Min: 77  Max: 115  Respiration Range (24h): Resp  Avg: 15.5  Min: 0  Max: 24  Current Pulse Ox (24h):  SpO2: 94 %  Pulse Ox Range (24h):  SpO2  Av %  Min: 86 %  Max: 100 %  Oxygen Amount and Delivery: O2 Flow Rate (L/min): 6 L/min  Incentive Spirometry Tx:   Treatment Tolerance: Well Incentive Spirometry Goal (mL): 1500 mL Incentive Spirometry Achieved (mL): 500 mL    GENERAL: alert, no distress  LUNGS: Diminished bases and clear to ausculation, without wheezes, rales or rhonci  HEART: Normal rate  ABDOMEN: Soft expected incisional tenderness   INCISION: healing well, no significant drainage, no significant erythema  EXTREMITY: no cyanosis and no edema  Drain: Serous slightly bile tinged from intraoperative spillage. Had spillage of bile drain output decreasing. In: 2405.4 [I.V.:2245.4]  Out: 3900 [Urine:3460; Drains:390]  Closed/Suction Drain Lateral RLQ Bulb 19 Greenlandic-Output (ml): 30 ml  Date 04/09/22 0000 - 04/09/22 2359   Shift 2983-1082 9357-9851 6272-6245 24 Hour Total   INTAKE   I.V.(mL/kg) 110.8(0.9)   110.8(0.9)   IV Piggyback(mL/kg) 13(0.1)   13(0.1)   Shift Total(mL/kg) 123.8(1)   123.8(1)   OUTPUT   Urine(mL/kg/hr) 1250(1.3)   1250   Drains(mL/kg) 95(0.8)   95(0.8)   Shift Total(mL/kg) 1345(11.1)   1345(11.1)   Weight (kg) 121 121 121 121     LABS     Recent Labs     04/06/22  1240 04/06/22  1240 04/07/22  0557 04/08/22  0608 04/08/22  0921 04/09/22  0420   WBC 11.3*  --  8.1  --   --  9.7   HGB 19.7*  --  17.3  --   --  16.3   HCT 61.0*  --  54.1*  --   --  50.3     --  149  --   --  151      < > 139 133*  --  131*   K 3.8   < > 4.1  4.1 5.0  --  4.2   CL 95*   < > 103 99  --  98   CO2 24   < > 22* 20*  --  21*   BUN 9   < > 9 9  --  11   CREATININE 0.8   < > 0.6  --  0.7 0.7   CALCIUM 10.3   < > 9.1  --  8.7 8.5    < > = values in this interval not displayed. No results for input(s): PTT, INR in the last 72 hours. Invalid input(s): PT  Recent Labs     04/06/22  1240 04/06/22  1240 04/06/22  1524 04/07/22  0557 04/08/22  0608 04/08/22  0921 04/09/22  0420   *   < >  --  261* 197*  --  143*   *   < >  --  320* 311*  --  272*   BILITOT 2.9*   < >  --  5.1*  --  3.5* 1.8*   BILIDIR 1.9*  --   --  4.4*  --   --   --    LIPASE 44.4  --  49.1  --   --   --   --    LACTA  --   --  2.1*  --   --   --   --     < > = values in this interval not displayed. Recent Labs     04/06/22  1240   TROPONINT < 0.010         RADIOLOGY     MRI ABDOMEN W WO CONTRAST MRCP   Final Result   1.  Tiny biliary calculi are seen in the common bile duct relating to choledocholithiasis. 2. Marked gallbladder distention with gallstones and sludge with pericholecystic fluid. Findings are suspicious for acute cholecystitis. 3. Marked hepatomegaly. Hepatic steatosis. 4. Other findings as described above. **This report has been created using voice recognition software. It may contain minor errors which are inherent in voice recognition technology. **      Final report electronically signed by Dr Burgess Keller on 4/7/2022 11:20 AM      [address] [place] [address]   Final Result   1. Marked gallbladder distention with pericholecystic fluid, gallbladder debris and sludge. Findings likely reflect acute cholecystitis. 2. Hepatomegaly. Hepatic steatosis. **This report has been created using voice recognition software. It may contain minor errors which are inherent in voice recognition technology. **      Final report electronically signed by Dr Burgess Keller on 4/6/2022 4:19 PM      CT ABDOMEN PELVIS W IV CONTRAST Additional Contrast? None   Final Result   1. Marked gallbladder distention with high density sludge/stones layering within the gallbladder and mild pericholecystic edema suggests acute cholecystitis in the appropriate clinical setting. No biliary ductal dilatation is observed. Correlate with    liver function tests. 2. Normal appendix. Colonic diverticulosis without diverticulitis. Moderate retained fecal material seen throughout the colon suggesting fecal stasis. No bowel obstruction. 3. Chronic findings are discussed. **This report has been created using voice recognition software. It may contain minor errors which are inherent in voice recognition technology. **      Final report electronically signed by Dr Calos Graham on 4/6/2022 2:19 PM          Electronically signed by Austin Angeles MD on 4/9/2022 at 9:15 AM

## 2022-04-09 NOTE — RT PROTOCOL NOTE
RT Inhaler-Nebulizer Bronchodilator Protocol Note    There is a bronchodilator order in the chart from a provider indicating to follow the RT Bronchodilator Protocol and there is an Initiate RT Inhaler-Nebulizer Bronchodilator Protocol order as well (see protocol at bottom of note). CXR Findings:  No results found. The findings from the last RT Protocol Assessment were as follows:   History Pulmonary Disease: Chronic pulmonary disease  Respiratory Pattern: Regular pattern and RR 12-20 bpm  Breath Sounds: Clear breath sounds  Cough: Strong, spontaneous, non-productive  Indication for Bronchodilator Therapy:    Bronchodilator Assessment Score: 2    Aerosolized bronchodilator medication orders have been revised according to the RT Inhaler-Nebulizer Bronchodilator Protocol below. Respiratory Therapist to perform RT Therapy Protocol Assessment initially then follow the protocol. Repeat RT Therapy Protocol Assessment PRN for score 0-3 or on second treatment, BID, and PRN for scores above 3. No Indications - adjust the frequency to every 6 hours PRN wheezing or bronchospasm, if no treatments needed after 48 hours then discontinue using Per Protocol order mode. If indication present, adjust the RT bronchodilator orders based on the Bronchodilator Assessment Score as indicated below. Use Inhaler orders unless patient has one or more of the following: on home nebulizer, not able to hold breath for 10 seconds, is not alert and oriented, cannot activate and use MDI correctly, or respiratory rate 25 breaths per minute or more, then use the equivalent nebulizer order(s) with same Frequency and PRN reasons based on the score. If a patient is on this medication at home then do not decrease Frequency below that used at home.     0-3 - enter or revise RT bronchodilator order(s) to equivalent RT Bronchodilator order with Frequency of every 4 hours PRN for wheezing or increased work of breathing using Per Protocol order mode. 4-6 - enter or revise RT Bronchodilator order(s) to two equivalent RT bronchodilator orders with one order with BID Frequency and one order with Frequency of every 4 hours PRN wheezing or increased work of breathing using Per Protocol order mode. 7-10 - enter or revise RT Bronchodilator order(s) to two equivalent RT bronchodilator orders with one order with TID Frequency and one order with Frequency of every 4 hours PRN wheezing or increased work of breathing using Per Protocol order mode. 11-13 - enter or revise RT Bronchodilator order(s) to one equivalent RT bronchodilator order with QID Frequency and an Albuterol order with Frequency of every 4 hours PRN wheezing or increased work of breathing using Per Protocol order mode. Greater than 13 - enter or revise RT Bronchodilator order(s) to one equivalent RT bronchodilator order with every 4 hours Frequency and an Albuterol order with Frequency of every 2 hours PRN wheezing or increased work of breathing using Per Protocol order mode. RT to enter RT Home Evaluation for COPD & MDI Assessment order using Per Protocol order mode.     Electronically signed by Vernard Fleischer, RCP on 4/9/2022 at 6:27 PM

## 2022-04-09 NOTE — PROGRESS NOTES
Gastroenterology Progress Note:     Patient Name:  Aisha Washburn   MRN: [id number]  [id number]  YOB: 1965  Admit Date: 4/6/2022 12:19 PM  Primary Care Physician: Jason Ramos     Patient seen and examined. 24 hours events and chart reviewed. Subjective: Patient sitting up in the chair. He endorses RUQ/surgical pain, recently received pain medication. Denies n/v. LFTs trending down. Objective:  /78   Pulse 77   Temp 97.3 °F (36.3 °C) (Oral)   Resp 18   Ht 5' 8\" (1.727 m)   Wt 266 lb 12.8 oz (121 kg)   SpO2 94%   BMI 40.57 kg/m²     Physical Exam:    General:  Nourished in no distress  HEENT: Atraumatic, normocephalic. Moist oral mucous membranes. Neck: Supple without adenopathy, JVD, thyromegaly or masses. Trachea midline. CV: Heart RRR, no murmurs, rubs, gallops. Resp: Dyspnea at rest. Lungs clear to ascultation bilaterally. Abd: Round, soft, obese, tender to RUQ & surgical areas. No hepatosplenomegaly or mass present. Active bowel sounds heard. No distention noted. PADMINI drain intact & patent. Ext:  Without cyanosis, clubbing, edema. Skin: Pink, warm, dry. Abdominal surgical sites dry & intact. Neuro:  Alert, oriented x 3 with no obvious deficits.        Rectal: deferred    Labs:   CBC:   Lab Results   Component Value Date    WBC 9.7 04/09/2022    HGB 16.3 04/09/2022    HCT 50.3 04/09/2022    MCV 92.1 04/09/2022     04/09/2022     BMP:   Lab Results   Component Value Date     04/09/2022    K 4.2 04/09/2022    CL 98 04/09/2022    CO2 21 04/09/2022    BUN 11 04/09/2022    CREATININE 0.7 04/09/2022    CALCIUM 8.5 04/09/2022     PT/INR: No results found for: PROTIME, INR  Lipids:   Lab Results   Component Value Date    ALKPHOS 202 04/09/2022     04/09/2022     04/09/2022    BILITOT 1.8 04/09/2022    BILIDIR 4.4 04/07/2022    LABALBU 3.3 04/09/2022    LIPASE 49.1 04/06/2022     Significant Diagnostic Studies:   Robotic assisted laparoscopic cholecystectomy with attempted ICG green cholangiography 04/08/22    Current Meds:  Scheduled Meds:   ipratropium-albuterol  1 ampule Inhalation Q4H WA    naloxegol  12.5 mg Oral QAM    [Held by provider] mometasone-formoterol  2 puff Inhalation BID    piperacillin-tazobactam (ZOSYN) 3375 mg in dextrose 5% IVPB extended infusion (mini-bag)  3,375 mg IntraVENous Q8H    insulin lispro  0-6 Units SubCUTAneous Q6H    [Held by provider] atorvastatin  40 mg Oral Daily    DULoxetine  30 mg Oral Daily    [Held by provider] HYDROcodone-acetaminophen  1 tablet Oral TID    metoprolol tartrate  25 mg Oral BID    montelukast  10 mg Oral Nightly    morphine  30 mg Oral TID    pantoprazole  40 mg IntraVENous Daily    pregabalin  200 mg Oral TID    risperiDONE  1 mg Oral Daily    [Held by provider] alogliptin  12.5 mg Oral Daily    tamsulosin  0.4 mg Oral Nightly    sodium chloride flush  5-40 mL IntraVENous 2 times per day    enoxaparin  40 mg SubCUTAneous Q12H     Continuous Infusions:   sodium chloride 75 mL/hr at 04/09/22 0906    dextrose      sodium chloride Stopped (04/07/22 2105)       Assessment:  62 yo M admitted 04/06/22 for RUQ pain & nausea. Initial WBC 11.3. LFTs elevated. CT A/P demonstrated marked gallbladder distention with high density sludge/stones layering within the gallbladder & mild pericholecystic edema suggesting acute cholecystitis, no biliary ductal dilatation. US abdomen demonstrated marked gallbladder distention with pericholecystic fluid, gallbladder debris & sludge, hepatomegaly, & hepatic steatosis. MRCP demonstrated tiny biliary calculi in the CBD relating to choledocholithiasis, marked gallbladder distention with gallstones & sludge with pericholecystic fluid, marked hepatomegaly, hepatic steatosis. Robotic assisted laparoscopic cholecystectomy with attempted cholangiogram 04/08/22.     1. RUQ pain- s/p cholecystectomy 04/08/22  2.  Nausea without vomiting- resolved  3. Acute cholecystitis- s/p cholecystectomy 04/08/22  4. Choledocholithiasis noted on MRCP- suspect passed a stone, LFTs trending down  5. Acute hypoxic respiratory failure- resolved  6. Elevated LFTs- trending down  7. Hyperbilirubinemia- trending dwon  8. Hepatomegaly  9. Hepatic steatosis  10. Leukocytosis- resolved  11. Asthma, not exacerbated  12. DM  13. H/O chronic back pain with chronic opioid use  14. GEENA  15. H/O anxiety  16.  Morbid obesity       Plan:    · Monitor HFP  · ATBs per primary  · Diet per surgery  · No plan for ERCP at this time as LFTs are trending down  · RN updated  · Supportive care per primary team  · Follow-up with GI as needed  GI signing off    Case reviewed and impression/plan reviewed in collaboration with Dr. Radha Brown  Electronically signed by ANNA Iraheta CNP on 4/9/2022 at 9:26 AM    [facility]

## 2022-04-09 NOTE — PROGRESS NOTES
Hospitalist Progress Note    Patient:  Magdalena Aguilar    YOB: 1965  Unit/Bed:5K-16/016-A  MRN: [id number]    Acct: [de-identified]   PCP: Amina Win DO    Date of Admission: 4/6/2022      Assessment/Plan:    · Acute Cholecystitis: ?cholangitis, + cholecystitis but no biliary dilation seen on CT abd/pelv. Lipase wnl, hepatitis as noted below. Suspect patient also passed a stone about a week ago as well based on his symptoms he describes. ? Empiric Zosyn  ? Cardiology consult for preop eval - appreciate assistance  ? IVF completed  ? S/p robotic Lap namrata on 4/8/22, EBL 50ml, placement of naomi drain (no IOC during procedure)  ? Dilaudid 1/1.5mg PRN (has significant home pain requirements). Bowel regimen is available. ? Schedule movantic  ? Likely change over to PO regimen if doing well tomorrow. ? Will cut back to dilaudid 0.5 - 1mg prn for mod-sev pain. · Mild hyponatremia: could be related to periop fluid shifts. Will monitor - repeat BMP in am - further workup if this remains. · Postop Acute Hypoxic Respiratory Failure: requiring HFNC (refused Bipap), comfortable and will continue breathing treatments and acapella/IS. · Down to room air. · OOB as able. Pulmonary hygeine  · Now s/p fluids. · Obstructive Transaminitis (on history of FLD): likely secondary to above. Alk phos 191, , , bilirubin 2.9 with a direct bilirubin of 1.9 on arrival.  There was no biliary ductal dilatation, possible passed stone. ? Trend lfts - worsening on 4/7 stable on 4/8 and improving on 4/9.    ? MRCP noted small stones in CBD, GI consulted  ? No plans for ERCP, GI to sign off.   ? Avoid hepatotoxins. Negative hepatitis panel   · Sepsis POA: with WBC, near tachycardia secondary to above. See above for management  ? S/p Aggressive fluids. ? Antibiotics with Zosyn continued. · Steatosis/Hepatomegaly: noted. Advise weight loss. Liver margin palpable in right mid abdomen. · TTransamanitis as above  · Asthma: no in acute exacerbation. He is on singulair and Trelegy pta, but takes the inhalers prn.   ? Will start albuterol prn and IS  ? Add on dulera perioperatively for control. ? Recommend PFTs as outpatient  · Abnormal EKG / CAD s/p 4v CABG: CABG in 2013 with Dr. Angelica Pate. Reports he has not seen a Cardiologist for some time. He takes ASA and two BB (lopressor and bisoprolol) in addition to statin. Initial troponin is negative. ? Continue on ASA if OK with gen surgery  ? EKG reviewed - no baseline - there is TWF in II, V3 and TWI in I, AVL V1-2  § Could be stress response, patient denies chest pain  § Continue ASA for now  ? Consult Cardiology given unknown history and prior CABG  ? Appreciate recs  ? Obtained echo for baseline -50% EF, no wall motion abnormalities  · DM2 with hyperglycemia: on farxiga, januvia, trulicity and amaryl PTA. ? Hold oral meds   ? SSI while npo, hypoglycemia protocol  ? Checking A1c.   7.7%. · Erythrocytosis: likely 2/2 dehydration, however patient is on testosterone so may need to stop this therapy if remains. ? Monitor CBC -improving down to normal range after fluids. No baseline labs to compare to.  ? Repeat 4/9 AM postoperatively is stable. · Depression: on risperdal, cymbalta - continue  · GEENA with inability to tolerate CPAP. Will need close monitoring postop. · Anxiety: on lorazapam nightly 0.5mg. · Chronic Back Pain: patient is chronically on MS Contin 30mg TID, Lyrica, Mobic, and Norco 10mg 5x daily prn. He follows his PCP for this. I reviewed PDMP. · See above for acute pain management  · Morbid Obesity: BMI 41, advise weight loss.    · Former tobacco      Expected discharge date:  tbd    Disposition: pending course  [] Home  [] TCU  [] Rehab  [] Psych  [] SNF  [] [place]  [] Other-    ===================================================================      Chief Complaint: Abdominal pain    Hospital Course: Per HPI, Marlo Evans is a 61 y.o. male with PMHx of asthma, obesity, GEENA with noncompliance, CAD status post CABG, former tobacco abuse who presents to [facility]. [place] [id number] with abdominal pain. Patient reports that this started last night suddenly and was described as a sharp pain in the right upper quadrant which radiated along his costal margin to the right posteriorly. Nothing made it better or worse, he reports a poor appetite and did not eat much during this time. He had some nausea but denied any vomiting. Admitted to loose stools. He reports that he had similar symptoms about a week ago which lasted 2 to 3 days and spontaneously resolved on their own. During that time he had dark/orange urine. He denies any associated fevers or chills, shortness of breath, chest pain or palpitations. He has chronic dyspnea on exertion that he attributes to his asthma however this is not become any worse.     The patient is a former tobacco user. He had a CABG in 2013 and is maintained on aspirin and bisoprolol/metoprolol and is currently followed by his PCP, has not seen a cardiologist in some time. Denies any anginal chest pain. He also has chronic back pain and prior surgery, maintained on MS Contin 30 mg 3 times daily, Norco  as needed, Lyrica, and Mobic by his PCP. He does not drink any alcohol. Subjective (past 24 hours): Patient seen at bedside, stable this morning. Doing well postoperatively. He reports that he has not had a bowel movement yet but is passing flatus. He is tender around the site of his prior surgery but states the pain is not the same as before and more of a soreness. No nausea or vomiting. He is tolerating a diet today.       Medications:  Reviewed    Infusion Medications    dextrose      sodium chloride Stopped (04/07/22 2105)     Scheduled Medications    insulin lispro  0-6 Units SubCUTAneous TID WC    insulin lispro  0-3 Units SubCUTAneous Nightly    naloxegol 12.5 mg Oral QAM    [Held by provider] mometasone-formoterol  2 puff Inhalation BID    piperacillin-tazobactam (ZOSYN) 3375 mg in dextrose 5% IVPB extended infusion (mini-bag)  3,375 mg IntraVENous Q8H    [Held by provider] atorvastatin  40 mg Oral Daily    DULoxetine  30 mg Oral Daily    [Held by provider] HYDROcodone-acetaminophen  1 tablet Oral TID    metoprolol tartrate  25 mg Oral BID    montelukast  10 mg Oral Nightly    morphine  30 mg Oral TID    pantoprazole  40 mg IntraVENous Daily    pregabalin  200 mg Oral TID    risperiDONE  1 mg Oral Daily    [Held by provider] alogliptin  12.5 mg Oral Daily    tamsulosin  0.4 mg Oral Nightly    sodium chloride flush  5-40 mL IntraVENous 2 times per day    enoxaparin  40 mg SubCUTAneous Q12H     PRN Meds: ipratropium-albuterol, glucagon (rDNA), dextrose, glucose, dextrose bolus (hypoglycemia) **OR** dextrose bolus (hypoglycemia), albuterol, sodium chloride flush, sodium chloride, polyethylene glycol, acetaminophen **OR** acetaminophen, HYDROmorphone **OR** HYDROmorphone, potassium chloride **OR** potassium alternative oral replacement **OR** potassium chloride, magnesium sulfate, bisacodyl      ROS: reviewed from prior note, full ROS unchanged unless otherwise stated in hospital course/subjective portion. Intake/Output Summary (Last 24 hours) at 4/9/2022 1841  Last data filed at 4/9/2022 1653  Gross per 24 hour   Intake 1115.4 ml   Output 2705 ml   Net -1589.6 ml       Exam:  /69   Pulse 88   Temp 98.2 °F (36.8 °C) (Oral)   Resp 18   Ht 5' 8\" (1.727 m)   Wt 266 lb 12.8 oz (121 kg)   SpO2 95%   BMI 40.57 kg/m²        General appearance: Obese, no distress  Eyes:  Pupils equal, round, and reactive to light. Conjunctivae/corneas clear. HENT: Head normal in appearance. External nares normal.  Oral mucosa without lesions. Hearing grossly intact. Neck: Supple, with full range of motion. Trachea midline.   No gross JVD appreciated. Respiratory:  Normal respiratory effort. Clear to auscultation, bilaterally without rales or wheezes or rhonchi. Diminished overall. Cardiovascular: Normal rate, regular rhythm with normal S1/S2 without murmurs. No lower extremity edema. Abdomen: Normoactive bs. Protuberant, there is pain to palpation in the RUQ (stable) near site of surgical drain. No rebound. Mild distension. Hepatomegaly noted below the right costal margin. Surgical wounds with slight reactive erythema. No discharge. Musculoskeletal: There is no joint swelling or tenderness. Normal tone. No abnormal movements. Skin: Warm and dry. Violaceous macules of the bilateral shins anteriorly. Neurologic:  No focal sensory/motor deficits in the upper and lower extremities. Cranial nerves:  grossly non-focal 2-12. Psychiatric: Alert and oriented, normal insight and thought content. Capillary Refill: Brisk,< 3 seconds. Peripheral Pulses: +2 palpable, equal bilaterally. Labs:   Recent Labs     04/07/22  0557 04/09/22  0420   WBC 8.1 9.7   HGB 17.3 16.3   HCT 54.1* 50.3    151     Recent Labs     04/07/22  0557 04/08/22  0608 04/08/22  0921 04/09/22  0420    133*  --  131*   K 4.1  4.1 5.0  --  4.2    99  --  98   CO2 22* 20*  --  21*   BUN 9 9  --  11   CREATININE 0.6  --  0.7 0.7   CALCIUM 9.1  --  8.7 8.5     Recent Labs     04/07/22  0557 04/08/22  0608 04/08/22  0921 04/09/22  0420   * 197*  --  143*   * 311*  --  272*   BILIDIR 4.4*  --   --   --    BILITOT 5.1*  --  3.5* 1.8*   ALKPHOS 198* 210*  --  202*     No results for input(s): INR in the last 72 hours. No results for input(s): Jaime Snowball in the last 72 hours. No results for input(s): PROCAL in the last 72 hours.    Lab Results   Component Value Date    NITRU NEGATIVE 04/06/2022    WBCUA NONE SEEN 04/06/2022    BACTERIA NONE SEEN 04/06/2022    RBCUA 0-2 04/06/2022    BLOODU NEGATIVE 04/06/2022    GLUCOSEU >= 1000 04/06/2022       Radiology (48 hours):  CT ABDOMEN PELVIS W IV CONTRAST Additional Contrast? None    Result Date: 4/6/2022  1. Marked gallbladder distention with high density sludge/stones layering within the gallbladder and mild pericholecystic edema suggests acute cholecystitis in the appropriate clinical setting. No biliary ductal dilatation is observed. Correlate with liver function tests. 2. Normal appendix. Colonic diverticulosis without diverticulitis. Moderate retained fecal material seen throughout the colon suggesting fecal stasis. No bowel obstruction. 3. Chronic findings are discussed. **This report has been created using voice recognition software. It may contain minor errors which are inherent in voice recognition technology. ** Final report electronically signed by Dr Kasi Lorenzo on 4/6/2022 2:19 PM    MRI ABDOMEN W WO CONTRAST MRCP    Result Date: 4/7/2022  1. Tiny biliary calculi are seen in the common bile duct relating to choledocholithiasis. 2. Marked gallbladder distention with gallstones and sludge with pericholecystic fluid. Findings are suspicious for acute cholecystitis. 3. Marked hepatomegaly. Hepatic steatosis. 4. Other findings as described above. **This report has been created using voice recognition software. It may contain minor errors which are inherent in voice recognition technology. ** Final report electronically signed by Dr Griselda Bobo on 4/7/2022 11:20 AM    [address] [place] [address]    Result Date: 4/6/2022  1. Marked gallbladder distention with pericholecystic fluid, gallbladder debris and sludge. Findings likely reflect acute cholecystitis. 2. Hepatomegaly. Hepatic steatosis. **This report has been created using voice recognition software. It may contain minor errors which are inherent in voice recognition technology. ** Final report electronically signed by Dr Griselda Bobo on 4/6/2022 4:19 PM       DVT prophylaxis:    [x] Lovenox  [] SCDs  [] SQ Heparin  [] Encourage ambulation   [] Already on Anticoagulation       Diet: ADULT DIET;  Regular; 4 carb choices (60 gm/meal)  Code Status: Full Code  PT/OT: yes  IVF: yes - will stop    Electronically signed by Mayo Allen DO on 4/9/2022 at 6:41 PM

## 2022-04-09 NOTE — PLAN OF CARE
Problem: Pain:  Goal: Control of acute pain  Description: Control of acute pain  Outcome: Ongoing  Note: Use of scheduled narcotics and PRN pain medications   Educated of use of pain scale of 0-10     Problem: Skin Integrity:  Goal: Will show no infection signs and symptoms  Description: Will show no infection signs and symptoms  Outcome: Ongoing  Note: Assess pts VS Q4 hrs to gauge early signs and symptoms of infection     Problem: Falls - Risk of:  Goal: Will remain free from falls  Description: Will remain free from falls  Outcome: Ongoing  Note: Chair alarm on  Call light within reach   Bedside table within reach  Hourly rounding     Problem: Ineffective Breathing Pattern  Goal: O2 Sat > 90%  4/9/2022 0120 by Arva Scheuermann, RN  Outcome: Ongoing  Note: Pt is being continually weaned tonight from [facility] Pt currently at [address] 50% with Spo2 sat at 98%  4/8/2022 2116 by Marilou Lobo RCP  Outcome: Ongoing  Care plan reviewed with patient and wife. Patient and wife verbalize understanding of the plan of care and contribute to goal setting.

## 2022-04-09 NOTE — PLAN OF CARE
Problem: Ineffective Breathing Pattern  Goal: O2 Sat > 90%  Outcome: Ongoing   Weaning pt HFNC. Goal is to get patient to 6 L NC. Pt is only on HFNC due to post abdominal surgery and was having discomfort in abdomen.

## 2022-04-10 LAB
ALBUMIN SERPL-MCNC: 3.7 G/DL (ref 3.5–5.1)
ALP BLD-CCNC: 163 U/L (ref 38–126)
ALT SERPL-CCNC: 194 U/L (ref 11–66)
ANION GAP SERPL CALCULATED.3IONS-SCNC: 13 MEQ/L (ref 8–16)
AST SERPL-CCNC: 76 U/L (ref 5–40)
BILIRUB SERPL-MCNC: 1.4 MG/DL (ref 0.3–1.2)
BILIRUBIN DIRECT: 0.7 MG/DL (ref 0–0.3)
BUN BLDV-MCNC: 13 MG/DL (ref 7–22)
CALCIUM SERPL-MCNC: 8.8 MG/DL (ref 8.5–10.5)
CHLORIDE BLD-SCNC: 100 MEQ/L (ref 98–111)
CO2: 25 MEQ/L (ref 23–33)
CREAT SERPL-MCNC: 0.7 MG/DL (ref 0.4–1.2)
GFR SERPL CREATININE-BSD FRML MDRD: > 90 ML/MIN/1.73M2
GLUCOSE BLD-MCNC: 147 MG/DL (ref 70–108)
GLUCOSE BLD-MCNC: 180 MG/DL (ref 70–108)
GLUCOSE BLD-MCNC: 187 MG/DL (ref 70–108)
GLUCOSE BLD-MCNC: 193 MG/DL (ref 70–108)
GLUCOSE BLD-MCNC: 249 MG/DL (ref 70–108)
POTASSIUM SERPL-SCNC: 3.6 MEQ/L (ref 3.5–5.2)
REASON FOR REJECTION: NORMAL
REJECTED TEST: NORMAL
SODIUM BLD-SCNC: 138 MEQ/L (ref 135–145)
TOTAL PROTEIN: 7 G/DL (ref 6.1–8)

## 2022-04-10 PROCEDURE — 94760 N-INVAS EAR/PLS OXIMETRY 1: CPT

## 2022-04-10 PROCEDURE — 99233 SBSQ HOSP IP/OBS HIGH 50: CPT | Performed by: INTERNAL MEDICINE

## 2022-04-10 PROCEDURE — 6360000002 HC RX W HCPCS: Performed by: INTERNAL MEDICINE

## 2022-04-10 PROCEDURE — 82248 BILIRUBIN DIRECT: CPT

## 2022-04-10 PROCEDURE — 6370000000 HC RX 637 (ALT 250 FOR IP): Performed by: INTERNAL MEDICINE

## 2022-04-10 PROCEDURE — 82948 REAGENT STRIP/BLOOD GLUCOSE: CPT

## 2022-04-10 PROCEDURE — 99024 POSTOP FOLLOW-UP VISIT: CPT | Performed by: SURGERY

## 2022-04-10 PROCEDURE — 94669 MECHANICAL CHEST WALL OSCILL: CPT

## 2022-04-10 PROCEDURE — 6360000002 HC RX W HCPCS: Performed by: SURGERY

## 2022-04-10 PROCEDURE — 2580000003 HC RX 258: Performed by: SURGERY

## 2022-04-10 PROCEDURE — 80053 COMPREHEN METABOLIC PANEL: CPT

## 2022-04-10 PROCEDURE — 36415 COLL VENOUS BLD VENIPUNCTURE: CPT

## 2022-04-10 PROCEDURE — 6370000000 HC RX 637 (ALT 250 FOR IP): Performed by: SURGERY

## 2022-04-10 PROCEDURE — 1200000000 HC SEMI PRIVATE

## 2022-04-10 PROCEDURE — C9113 INJ PANTOPRAZOLE SODIUM, VIA: HCPCS | Performed by: SURGERY

## 2022-04-10 RX ORDER — ALOGLIPTIN 12.5 MG/1
12.5 TABLET, FILM COATED ORAL DAILY
Status: DISCONTINUED | OUTPATIENT
Start: 2022-04-10 | End: 2022-04-11 | Stop reason: HOSPADM

## 2022-04-10 RX ORDER — LACTOBACILLUS RHAMNOSUS GG 10B CELL
1 CAPSULE ORAL 2 TIMES DAILY WITH MEALS
Status: DISCONTINUED | OUTPATIENT
Start: 2022-04-10 | End: 2022-04-11 | Stop reason: HOSPADM

## 2022-04-10 RX ORDER — OXYCODONE HYDROCHLORIDE 5 MG/1
5 TABLET ORAL EVERY 4 HOURS PRN
Status: DISCONTINUED | OUTPATIENT
Start: 2022-04-10 | End: 2022-04-10

## 2022-04-10 RX ORDER — AMOXICILLIN AND CLAVULANATE POTASSIUM 875; 125 MG/1; MG/1
1 TABLET, FILM COATED ORAL EVERY 12 HOURS SCHEDULED
Status: DISCONTINUED | OUTPATIENT
Start: 2022-04-10 | End: 2022-04-10

## 2022-04-10 RX ORDER — OXYCODONE HYDROCHLORIDE 5 MG/1
10 TABLET ORAL EVERY 4 HOURS PRN
Status: DISCONTINUED | OUTPATIENT
Start: 2022-04-10 | End: 2022-04-10

## 2022-04-10 RX ORDER — AMOXICILLIN AND CLAVULANATE POTASSIUM 875; 125 MG/1; MG/1
1 TABLET, FILM COATED ORAL 2 TIMES DAILY WITH MEALS
Status: DISCONTINUED | OUTPATIENT
Start: 2022-04-10 | End: 2022-04-11 | Stop reason: HOSPADM

## 2022-04-10 RX ORDER — HYDROCODONE BITARTRATE AND ACETAMINOPHEN 10; 325 MG/1; MG/1
1 TABLET ORAL 3 TIMES DAILY
Status: DISCONTINUED | OUTPATIENT
Start: 2022-04-10 | End: 2022-04-10

## 2022-04-10 RX ORDER — HYDROCODONE BITARTRATE AND ACETAMINOPHEN 10; 325 MG/1; MG/1
1 TABLET ORAL EVERY 4 HOURS PRN
Status: DISCONTINUED | OUTPATIENT
Start: 2022-04-10 | End: 2022-04-11 | Stop reason: HOSPADM

## 2022-04-10 RX ADMIN — METOPROLOL TARTRATE 25 MG: 50 TABLET, FILM COATED ORAL at 21:15

## 2022-04-10 RX ADMIN — NALOXEGOL OXALATE 12.5 MG: 12.5 TABLET, FILM COATED ORAL at 10:34

## 2022-04-10 RX ADMIN — INSULIN LISPRO 2 UNITS: 100 INJECTION, SOLUTION INTRAVENOUS; SUBCUTANEOUS at 18:45

## 2022-04-10 RX ADMIN — SODIUM CHLORIDE: 9 INJECTION, SOLUTION INTRAVENOUS at 03:25

## 2022-04-10 RX ADMIN — PREGABALIN 200 MG: 75 CAPSULE ORAL at 15:59

## 2022-04-10 RX ADMIN — INSULIN LISPRO 1 UNITS: 100 INJECTION, SOLUTION INTRAVENOUS; SUBCUTANEOUS at 09:49

## 2022-04-10 RX ADMIN — MORPHINE SULFATE 30 MG: 30 TABLET, FILM COATED, EXTENDED RELEASE ORAL at 15:48

## 2022-04-10 RX ADMIN — INSULIN LISPRO 1 UNITS: 100 INJECTION, SOLUTION INTRAVENOUS; SUBCUTANEOUS at 12:44

## 2022-04-10 RX ADMIN — PIPERACILLIN AND TAZOBACTAM 3375 MG: 3; .375 INJECTION, POWDER, LYOPHILIZED, FOR SOLUTION INTRAVENOUS at 01:28

## 2022-04-10 RX ADMIN — RISPERIDONE 1 MG: 1 TABLET ORAL at 10:35

## 2022-04-10 RX ADMIN — PANTOPRAZOLE SODIUM 40 MG: 40 INJECTION, POWDER, FOR SOLUTION INTRAVENOUS at 12:44

## 2022-04-10 RX ADMIN — PREGABALIN 200 MG: 75 CAPSULE ORAL at 10:04

## 2022-04-10 RX ADMIN — HYDROMORPHONE HYDROCHLORIDE 1 MG: 1 INJECTION, SOLUTION INTRAMUSCULAR; INTRAVENOUS; SUBCUTANEOUS at 05:10

## 2022-04-10 RX ADMIN — MORPHINE SULFATE 30 MG: 30 TABLET, FILM COATED, EXTENDED RELEASE ORAL at 06:29

## 2022-04-10 RX ADMIN — PREGABALIN 200 MG: 75 CAPSULE ORAL at 21:15

## 2022-04-10 RX ADMIN — PIPERACILLIN AND TAZOBACTAM 3375 MG: 3; .375 INJECTION, POWDER, LYOPHILIZED, FOR SOLUTION INTRAVENOUS at 09:47

## 2022-04-10 RX ADMIN — SODIUM CHLORIDE, PRESERVATIVE FREE 10 ML: 5 INJECTION INTRAVENOUS at 21:16

## 2022-04-10 RX ADMIN — METOPROLOL TARTRATE 25 MG: 50 TABLET, FILM COATED ORAL at 10:35

## 2022-04-10 RX ADMIN — ALOGLIPTIN 12.5 MG: 12.5 TABLET, FILM COATED ORAL at 16:03

## 2022-04-10 RX ADMIN — MONTELUKAST SODIUM 10 MG: 10 TABLET, FILM COATED ORAL at 21:15

## 2022-04-10 RX ADMIN — HYDROCODONE BITARTRATE AND ACETAMINOPHEN 1 TABLET: 10; 325 TABLET ORAL at 12:51

## 2022-04-10 RX ADMIN — ENOXAPARIN SODIUM 40 MG: 100 INJECTION SUBCUTANEOUS at 21:16

## 2022-04-10 RX ADMIN — INSULIN LISPRO 1 UNITS: 100 INJECTION, SOLUTION INTRAVENOUS; SUBCUTANEOUS at 21:20

## 2022-04-10 RX ADMIN — HYDROCODONE BITARTRATE AND ACETAMINOPHEN 1 TABLET: 10; 325 TABLET ORAL at 21:15

## 2022-04-10 RX ADMIN — ENOXAPARIN SODIUM 40 MG: 100 INJECTION SUBCUTANEOUS at 09:49

## 2022-04-10 RX ADMIN — TAMSULOSIN HYDROCHLORIDE 0.4 MG: 0.4 CAPSULE ORAL at 21:15

## 2022-04-10 RX ADMIN — AMOXICILLIN AND CLAVULANATE POTASSIUM 1 TABLET: 875; 125 TABLET, FILM COATED ORAL at 18:47

## 2022-04-10 RX ADMIN — MORPHINE SULFATE 30 MG: 30 TABLET, FILM COATED, EXTENDED RELEASE ORAL at 23:10

## 2022-04-10 RX ADMIN — DULOXETINE HYDROCHLORIDE 30 MG: 30 CAPSULE, DELAYED RELEASE ORAL at 10:34

## 2022-04-10 RX ADMIN — HYDROMORPHONE HYDROCHLORIDE 0.5 MG: 1 INJECTION, SOLUTION INTRAMUSCULAR; INTRAVENOUS; SUBCUTANEOUS at 10:34

## 2022-04-10 RX ADMIN — Medication 1 CAPSULE: at 18:45

## 2022-04-10 ASSESSMENT — PAIN DESCRIPTION - PROGRESSION
CLINICAL_PROGRESSION: GRADUALLY WORSENING

## 2022-04-10 ASSESSMENT — PAIN DESCRIPTION - LOCATION
LOCATION: BACK

## 2022-04-10 ASSESSMENT — PAIN DESCRIPTION - ORIENTATION
ORIENTATION: LOWER

## 2022-04-10 ASSESSMENT — PAIN SCALES - GENERAL
PAINLEVEL_OUTOF10: 5
PAINLEVEL_OUTOF10: 6
PAINLEVEL_OUTOF10: 6
PAINLEVEL_OUTOF10: 4
PAINLEVEL_OUTOF10: 3
PAINLEVEL_OUTOF10: 6
PAINLEVEL_OUTOF10: 4
PAINLEVEL_OUTOF10: 3
PAINLEVEL_OUTOF10: 3
PAINLEVEL_OUTOF10: 4
PAINLEVEL_OUTOF10: 6

## 2022-04-10 ASSESSMENT — PAIN DESCRIPTION - FREQUENCY
FREQUENCY: CONTINUOUS

## 2022-04-10 ASSESSMENT — PAIN DESCRIPTION - PAIN TYPE
TYPE: CHRONIC PAIN

## 2022-04-10 ASSESSMENT — PAIN DESCRIPTION - ONSET
ONSET: ON-GOING
ONSET: GRADUAL
ONSET: GRADUAL

## 2022-04-10 ASSESSMENT — PAIN - FUNCTIONAL ASSESSMENT
PAIN_FUNCTIONAL_ASSESSMENT: ACTIVITIES ARE NOT PREVENTED
PAIN_FUNCTIONAL_ASSESSMENT: PREVENTS OR INTERFERES SOME ACTIVE ACTIVITIES AND ADLS

## 2022-04-10 ASSESSMENT — PAIN SCALES - WONG BAKER
WONGBAKER_NUMERICALRESPONSE: 0

## 2022-04-10 ASSESSMENT — PAIN DESCRIPTION - DESCRIPTORS
DESCRIPTORS: ACHING

## 2022-04-10 NOTE — PROGRESS NOTES
Cordell Kelly MD  Postoperative Progress Note  Pt Name: Jalil Colon  Medical Record Number: [id number]  Date of Birth 1965   Today's Date: 4/10/2022  ASSESSMENT   1. POD # 2 robotic assisted laparoscopic cholecystectomy with ICG green cholangiography  2. Massively distended gallbladder with sludge and many small stones  3. Elevated liver function test resolving. Labs unable to draw patient this AM.  Postoperative respiratory insufficiency resolved on room air   has a past medical history of CAD (coronary artery disease), Diabetes mellitus ([facility] [address] Hyperlipidemia, Hypertension, Movement disorder, Neuromuscular disorder ([facility] [address] Pneumonia, and Psychiatric problem. PLANS   1. Analgesics and antiemetics as needed  2. IV hydration may discontinue  3. Diet as tolerated  4. Increase activity  5. Lovenox for DVT prophylaxis. Encourage ambulation. 6. ERCP not needed at this point. [address] [facility] is doing very well. Discussed with Dr. Alvarez. From a surgical standpoint when deemed medically stable can discharge home with drain and follow-up with me in my office on Thursday. Home on 5 days Augmentin. [facility] is doing better. Drain output decreased. Serous. Tolerating oral intake. Having bowel movements.   CURRENT MEDICATIONS   Scheduled Meds:   insulin lispro  0-6 Units SubCUTAneous TID WC    insulin lispro  0-3 Units SubCUTAneous Nightly    naloxegol  12.5 mg Oral QAM    piperacillin-tazobactam (ZOSYN) 3375 mg in dextrose 5% IVPB extended infusion (mini-bag)  3,375 mg IntraVENous Q8H    [Held by provider] atorvastatin  40 mg Oral Daily    DULoxetine  30 mg Oral Daily    [Held by provider] HYDROcodone-acetaminophen  1 tablet Oral TID    metoprolol tartrate  25 mg Oral BID    montelukast  10 mg Oral Nightly    morphine  30 mg Oral TID    pantoprazole  40 mg IntraVENous Daily    pregabalin  200 mg Oral TID    risperiDONE  1 mg Oral Daily    [Held by provider] alogliptin  12.5 mg Oral Daily    tamsulosin  0.4 mg Oral Nightly    sodium chloride flush  5-40 mL IntraVENous 2 times per day    enoxaparin  40 mg SubCUTAneous Q12H     Continuous Infusions:   dextrose      sodium chloride 20 mL/hr at 04/10/22 0325     PRN Meds:.mometasone-formoterol, HYDROmorphone **OR** HYDROmorphone, glucagon (rDNA), dextrose, glucose, dextrose bolus (hypoglycemia) **OR** dextrose bolus (hypoglycemia), albuterol, sodium chloride flush, sodium chloride, polyethylene glycol, acetaminophen **OR** acetaminophen, potassium chloride **OR** potassium alternative oral replacement **OR** potassium chloride, magnesium sulfate, bisacodyl  OBJECTIVE   CURRENT VITALS:  height is 5' 8\" (1.727 m) and weight is 266 lb 12.8 oz (121 kg). His oral temperature is 97.7 °F (36.5 °C). His blood pressure is 117/80 and his pulse is 72. His respiration is 18 and oxygen saturation is 93%. Body mass index is 40.57 kg/m². Temperature Range (24h):Temp: 97.7 °F (36.5 °C) Temp  Av.7 °F (36.5 °C)  Min: 97.3 °F (36.3 °C)  Max: 98.2 °F (36.8 °C)  BP Range (07K): Systolic (05MEY), EIY:722 , Min:116 , MAURO:785     Diastolic (01AZH), PARAG:73, Min:66, Max:80    Pulse Range (24h): Pulse  Av.2  Min: 72  Max: 88  Respiration Range (24h): Resp  Av.7  Min: 16  Max: 18  Current Pulse Ox (24h):  SpO2: 93 %  Pulse Ox Range (24h):  SpO2  Av.3 %  Min: 92 %  Max: 95 %  Oxygen Amount and Delivery: O2 Flow Rate (L/min): 0 L/min  Incentive Spirometry Tx:   Treatment Tolerance: Well Incentive Spirometry Goal (mL): 1500 mL Incentive Spirometry Achieved (mL): 2000 mL    GENERAL: alert, no distress  LUNGS: Clear  HEART: Normal rate  ABDOMEN: Soft expected incisional tenderness   INCISION: healing well, no significant drainage, no significant erythema  EXTREMITY: no cyanosis and no edema  Drain: Serous In: 300.1 [P.O.:240;  I.V.:10]  Out: 1220 [Urine:1050; Drains:170]  Closed/Suction Drain Lateral RLQ Bulb 19 Arabic-Output (ml):  (not emptied)  Date 04/10/22 0000 - 04/10/22 2359   Shift 4237-23066 8970-4675 1382-1218 24 Hour Total   INTAKE   P.O.(mL/kg/hr) 120(0.1)   120   Shift Total(mL/kg) 120(1)   120(1)   OUTPUT   Urine(mL/kg/hr) 450(0.5)   450   Shift Total(mL/kg) 450(3.7)   450(3.7)   Weight (kg) 121 121 121 121     LABS     Recent Labs     04/08/22  0608 04/08/22  0921 04/09/22  0420   WBC  --   --  9.7   HGB  --   --  16.3   HCT  --   --  50.3   PLT  --   --  151   *  --  131*   K 5.0  --  4.2   CL 99  --  98   CO2 20*  --  21*   BUN 9  --  11   CREATININE  --  0.7 0.7   CALCIUM  --  8.7 8.5      No results for input(s): PTT, INR in the last 72 hours. Invalid input(s): PT  Recent Labs     04/08/22 0608 04/08/22 0921 04/09/22  0420   *  --  143*   *  --  272*   BILITOT  --  3.5* 1.8*     No results for input(s): TROPONINT in the last 72 hours. RADIOLOGY     MRI ABDOMEN W WO CONTRAST MRCP   Final Result   1. Tiny biliary calculi are seen in the common bile duct relating to choledocholithiasis. 2. Marked gallbladder distention with gallstones and sludge with pericholecystic fluid. Findings are suspicious for acute cholecystitis. 3. Marked hepatomegaly. Hepatic steatosis. 4. Other findings as described above. **This report has been created using voice recognition software. It may contain minor errors which are inherent in voice recognition technology. **      Final report electronically signed by Dr Marichuy Elizondo on 4/7/2022 11:20 AM      [address] [place] [address]   Final Result   1. Marked gallbladder distention with pericholecystic fluid, gallbladder debris and sludge. Findings likely reflect acute cholecystitis. 2. Hepatomegaly. Hepatic steatosis. **This report has been created using voice recognition software. It may contain minor errors which are inherent in voice recognition technology. **      Final report electronically signed by Dr Colby Bermeo Renato on 4/6/2022 4:19 PM      CT ABDOMEN PELVIS W IV CONTRAST Additional Contrast? None   Final Result   1. Marked gallbladder distention with high density sludge/stones layering within the gallbladder and mild pericholecystic edema suggests acute cholecystitis in the appropriate clinical setting. No biliary ductal dilatation is observed. Correlate with    liver function tests. 2. Normal appendix. Colonic diverticulosis without diverticulitis. Moderate retained fecal material seen throughout the colon suggesting fecal stasis. No bowel obstruction. 3. Chronic findings are discussed. **This report has been created using voice recognition software. It may contain minor errors which are inherent in voice recognition technology. **      Final report electronically signed by Dr Lizette Michel on 4/6/2022 2:19 PM          Electronically signed by Evangelina Esquivel MD on 4/10/2022 at 8:40 AM

## 2022-04-10 NOTE — PLAN OF CARE
Problem: Pain:  Goal: Pain level will decrease  Description: Pain level will decrease  Outcome: Met This Shift  Goal: Control of acute pain  Description: Control of acute pain  Outcome: Met This Shift  Goal: Control of chronic pain  Description: Control of chronic pain  Outcome: Ongoing     Problem: Skin Integrity:  Goal: Will show no infection signs and symptoms  Description: Will show no infection signs and symptoms  Outcome: Met This Shift  Goal: Absence of new skin breakdown  Description: Absence of new skin breakdown  Outcome: Met This Shift     Problem: Falls - Risk of:  Goal: Will remain free from falls  Description: Will remain free from falls  Outcome: Met This Shift  Goal: Absence of physical injury  Description: Absence of physical injury  Outcome: Met This Shift     Problem: Discharge Planning:  Goal: Discharged to appropriate level of care  Description: Discharged to appropriate level of care  Outcome: Ongoing     Problem: Ineffective Breathing Pattern  Goal: O2 Sat > 90%  Outcome: Met This Shift

## 2022-04-10 NOTE — PROGRESS NOTES
Hospitalist Progress Note    Patient:  Debbie Olmos    YOB: 1965  Unit/Bed:5K-16/016-A  MRN: [id number]    Acct: [de-identified]   PCP: Milton Velasco DO    Date of Admission: 4/6/2022      Assessment/Plan:    · Acute Cholecystitis/Choledocholithiasis: +/-cholangitis, + cholecystitis but no biliary dilation seen on CT abd/pelv. Lipase wnl, hepatitis as noted below. Suspect patient also passed a stone about a week ago as well based on his symptoms he describes. S/p robotic Lap namrata on 4/8/22, EBL 50ml, placement of naomi drain (no IOC during procedure)  ? S/p Zosyn 4/6-4/10, will transition to Augmentin and complete 10-14 days total antibiotics upon dc.   ? Probiotic on board  ? Cardiology consult for preop eval - appreciate assistance  ? Dilaudid to be changed to home norco   ? Schedule movantic, prn bowel regomen  ? Likely dc tomorrow if pain controlled  ? Patient reports he wants to avoid oxycodone  · Mild hyponatremia (resolved s/p IVF): could be related to periop fluid shifts. Will monitor - repeat BMP in am - further workup if this remains. · Postop Acute Hypoxic Respiratory Failure (resolved): requiring HFNC (refused Bipap), comfortable and will continue breathing treatments and acapella/IS. · Down to room air. · OOB as able. Pulmonary hygeine  · Now s/p fluids. · Obstructive Transaminitis (on history of FLD): likely secondary to above. Alk phos 191, , , bilirubin 2.9 with a direct bilirubin of 1.9 on arrival.  There was no biliary ductal dilatation, possible passed stone. ? Trend lfts - worsening on 4/7 stable on 4/8 and improving on 4/9 as well as 4/10.    ? MRCP noted small stones in CBD, GI consulted  ? No plans for ERCP, GI to sign off.   ? Avoid hepatotoxins. Negative hepatitis panel   ? Repeat tomorrow and recommend as outpatien tas well. · Sepsis POA: with WBC, near tachycardia secondary to above. See above for management  ?  S/p Aggressive fluids. Antibiotics with Zosyn continued transition to Augmentin on DC. · Steatosis/Hepatomegaly: noted. Advise weight loss. Liver margin palpable in right mid abdomen. · Transamanitis as above  · Asthma: no in acute exacerbation. He is on singulair and Trelegy pta, but takes the inhalers prn.   ? Will start albuterol prn and IS  ? Add on dulera perioperatively for control -> change to PRN as he takes this way at home. · Abnormal EKG / CAD s/p 4v CABG: CABG in 2013 with Dr. Breezy Wills. Reports he has not seen a Cardiologist for some time. He takes ASA and two BB (lopressor and bisoprolol) in addition to statin. Initial troponin is negative. ? Continue on ASA if OK with gen surgery  ? EKG reviewed - no baseline - there is TWF in II, V3 and TWI in I, AVL V1-2  § Could be stress response, patient denies chest pain  § Continue ASA for now  ? Consult Cardiology given unknown history and prior CABG  ? Appreciate recs  ? Obtained echo for baseline -50% EF, no wall motion abnormalities  · DM2 with hyperglycemia: on farxiga, januvia, trulicity and amaryl PTA. ? Hold oral meds   ? SSI while npo, hypoglycemia protocol  ? A1c.   7.7%. · Erythrocytosis: likely 2/2 dehydration, however patient is on testosterone so may need to stop this therapy if remains. ? Monitor CBC -improving down to normal range after fluids. No baseline labs to compare to.  ? Repeat 4/9 AM postoperatively is stable. · Depression: on risperdal, cymbalta - continue  · GEENA with inability to tolerate CPAP. Will need close monitoring postop. · Anxiety: on lorazapam nightly 0.5mg. · Chronic Back Pain: patient is chronically on MS Contin 30mg TID, Lyrica, Mobic, and Norco 10mg 5x daily prn. He follows his PCP for this. I reviewed PDMP. · See above for acute pain management  · Morbid Obesity: BMI 41, advise weight loss.    · Former tobacco      Expected discharge date:  tbd    Disposition: pending course  [] Home  [] TCU  [] Rehab  [] Psych  [] SNF  [] [facility]  [] Other-    ===================================================================      Chief Complaint: Abdominal pain    Hospital Course: Per HPI, Arina Cash is a 61 y.o. male with PMHx of asthma, obesity, GEENA with noncompliance, CAD status post CABG, former tobacco abuse who presents to [address] [place] [address] with abdominal pain. Patient reports that this started last night suddenly and was described as a sharp pain in the right upper quadrant which radiated along his costal margin to the right posteriorly. Nothing made it better or worse, he reports a poor appetite and did not eat much during this time. He had some nausea but denied any vomiting. Admitted to loose stools. He reports that he had similar symptoms about a week ago which lasted 2 to 3 days and spontaneously resolved on their own. During that time he had dark/orange urine. He denies any associated fevers or chills, shortness of breath, chest pain or palpitations. He has chronic dyspnea on exertion that he attributes to his asthma however this is not become any worse.     The patient is a former tobacco user. He had a CABG in 2013 and is maintained on aspirin and bisoprolol/metoprolol and is currently followed by his PCP, has not seen a cardiologist in some time. Denies any anginal chest pain. He also has chronic back pain and prior surgery, maintained on MS Contin 30 mg 3 times daily, Norco  as needed, Lyrica, and Mobic by his PCP. He does not drink any alcohol. Subjective (past 24 hours): Patient seen at bedside, stable this morning. His pain is improving and is requiring less IV Dilaudid. He is agreeable to transition to his home regimen of scheduled Norco.  He agreed to continue monitoring overnight to ensure this controls his pain adequately. No bowel movement but reports flatus and denies any feelings of constipation.       Medications:  Reviewed    Infusion Medications    dextrose      sodium chloride 20 mL/hr at 04/10/22 0325     Scheduled Medications    HYDROcodone-acetaminophen  1 tablet Oral TID    lactobacillus  1 capsule Oral BID WC    amoxicillin-clavulanate  1 tablet Oral BID WC    insulin lispro  0-6 Units SubCUTAneous TID WC    insulin lispro  0-3 Units SubCUTAneous Nightly    naloxegol  12.5 mg Oral QAM    [Held by provider] atorvastatin  40 mg Oral Daily    DULoxetine  30 mg Oral Daily    metoprolol tartrate  25 mg Oral BID    montelukast  10 mg Oral Nightly    morphine  30 mg Oral TID    pantoprazole  40 mg IntraVENous Daily    pregabalin  200 mg Oral TID    risperiDONE  1 mg Oral Daily    [Held by provider] alogliptin  12.5 mg Oral Daily    tamsulosin  0.4 mg Oral Nightly    sodium chloride flush  5-40 mL IntraVENous 2 times per day    enoxaparin  40 mg SubCUTAneous Q12H     PRN Meds: mometasone-formoterol, glucagon (rDNA), dextrose, glucose, dextrose bolus (hypoglycemia) **OR** dextrose bolus (hypoglycemia), albuterol, sodium chloride flush, sodium chloride, polyethylene glycol, acetaminophen **OR** acetaminophen, potassium chloride **OR** potassium alternative oral replacement **OR** potassium chloride, magnesium sulfate, bisacodyl      ROS: reviewed from prior note, full ROS unchanged unless otherwise stated in hospital course/subjective portion. Intake/Output Summary (Last 24 hours) at 4/10/2022 1418  Last data filed at 4/10/2022 1350  Gross per 24 hour   Intake 530.1 ml   Output 910 ml   Net -379.9 ml       Exam:  BP (!) 144/87   Pulse 82   Temp 97.9 °F (36.6 °C) (Oral)   Resp 18   Ht 5' 8\" (1.727 m)   Wt 266 lb 12.8 oz (121 kg)   SpO2 93%   BMI 40.57 kg/m²        General appearance: Obese, no distress  Eyes:  Pupils equal, round, and reactive to light. Conjunctivae/corneas clear. HENT: Head normal in appearance. External nares normal.  Oral mucosa without lesions. Hearing grossly intact.    Neck: Supple, with full range of motion. Trachea midline. No gross JVD appreciated. Respiratory:  Normal respiratory effort. Clear to auscultation, bilaterally without rales or wheezes or rhonchi. Diminished overall. Cardiovascular: Normal rate, regular rhythm with normal S1/S2 without murmurs. No lower extremity edema. Abdomen: Normoactive bs. Protuberant, there is minimal pain to palpation in the RUQ (stable) near site of surgical drain. No rebound. Mild distension. Hepatomegaly noted below the right costal margin. Surgical wounds with slight reactive erythema. No discharge. Musculoskeletal: There is no joint swelling or tenderness. Normal tone. No abnormal movements. Skin: Warm and dry. Violaceous macules of the bilateral shins anteriorly. Neurologic:  No focal sensory/motor deficits in the upper and lower extremities. Cranial nerves:  grossly non-focal 2-12. Psychiatric: Alert and oriented, normal insight and thought content. Capillary Refill: Brisk,< 3 seconds. Peripheral Pulses: +2 palpable, equal bilaterally. Labs:   Recent Labs     04/09/22 0420   WBC 9.7   HGB 16.3   HCT 50.3        Recent Labs     04/08/22  0608 04/08/22  0921 04/09/22 0420 04/10/22  1015   *  --  131* 138   K 5.0  --  4.2 3.6   CL 99  --  98 100   CO2 20*  --  21* 25   BUN 9  --  11 13   CREATININE  --  0.7 0.7 0.7   CALCIUM  --  8.7 8.5 8.8     Recent Labs     04/08/22  0608 04/08/22 0921 04/09/22 0420 04/10/22  1015   *  --  143* 76*   *  --  272* 194*   BILIDIR  --   --   --  0.7*   BILITOT  --  3.5* 1.8* 1.4*   ALKPHOS 210*  --  202* 163*     No results for input(s): INR in the last 72 hours. No results for input(s): [place] in the last 72 hours. No results for input(s): PROCAL in the last 72 hours.    Lab Results   Component Value Date    NITRU NEGATIVE 04/06/2022    WBCUA NONE SEEN 04/06/2022    BACTERIA NONE SEEN 04/06/2022    RBCUA 0-2 04/06/2022    BLOODU NEGATIVE 04/06/2022 GLUCOSEU >= 1000 04/06/2022       Radiology (48 hours):  CT ABDOMEN PELVIS W IV CONTRAST Additional Contrast? None    Result Date: 4/6/2022  1. Marked gallbladder distention with high density sludge/stones layering within the gallbladder and mild pericholecystic edema suggests acute cholecystitis in the appropriate clinical setting. No biliary ductal dilatation is observed. Correlate with liver function tests. 2. Normal appendix. Colonic diverticulosis without diverticulitis. Moderate retained fecal material seen throughout the colon suggesting fecal stasis. No bowel obstruction. 3. Chronic findings are discussed. **This report has been created using voice recognition software. It may contain minor errors which are inherent in voice recognition technology. ** Final report electronically signed by Dr aMdeline Newton on 4/6/2022 2:19 PM    MRI ABDOMEN W WO CONTRAST MRCP    Result Date: 4/7/2022  1. Tiny biliary calculi are seen in the common bile duct relating to choledocholithiasis. 2. Marked gallbladder distention with gallstones and sludge with pericholecystic fluid. Findings are suspicious for acute cholecystitis. 3. Marked hepatomegaly. Hepatic steatosis. 4. Other findings as described above. **This report has been created using voice recognition software. It may contain minor errors which are inherent in voice recognition technology. ** Final report electronically signed by Dr Kristen Newman on 4/7/2022 11:20 AM    [address] [place] [address]    Result Date: 4/6/2022  1. Marked gallbladder distention with pericholecystic fluid, gallbladder debris and sludge. Findings likely reflect acute cholecystitis. 2. Hepatomegaly. Hepatic steatosis. **This report has been created using voice recognition software. It may contain minor errors which are inherent in voice recognition technology. ** Final report electronically signed by Dr Kristen Newman on 4/6/2022 4:19 PM       DVT prophylaxis:    [x] Lovenox  [] SCDs  [] SQ Heparin  [] Encourage ambulation   [] Already on Anticoagulation       Diet: ADULT DIET;  Regular; 4 carb choices (60 gm/meal)  Code Status: Full Code  PT/OT: yes  IVF: completed    Electronically signed by Brisa Pink DO on 4/10/2022 at 2:18 PM

## 2022-04-11 VITALS
HEIGHT: 68 IN | RESPIRATION RATE: 18 BRPM | WEIGHT: 266.8 LBS | BODY MASS INDEX: 40.44 KG/M2 | DIASTOLIC BLOOD PRESSURE: 80 MMHG | TEMPERATURE: 97.7 F | HEART RATE: 85 BPM | SYSTOLIC BLOOD PRESSURE: 140 MMHG | OXYGEN SATURATION: 93 %

## 2022-04-11 LAB
GLUCOSE BLD-MCNC: 157 MG/DL (ref 70–108)
GLUCOSE BLD-MCNC: 183 MG/DL (ref 70–108)

## 2022-04-11 PROCEDURE — 6370000000 HC RX 637 (ALT 250 FOR IP): Performed by: INTERNAL MEDICINE

## 2022-04-11 PROCEDURE — 99024 POSTOP FOLLOW-UP VISIT: CPT | Performed by: SURGERY

## 2022-04-11 PROCEDURE — 6360000002 HC RX W HCPCS: Performed by: SURGERY

## 2022-04-11 PROCEDURE — 2580000003 HC RX 258: Performed by: SURGERY

## 2022-04-11 PROCEDURE — 6370000000 HC RX 637 (ALT 250 FOR IP): Performed by: SURGERY

## 2022-04-11 PROCEDURE — C9113 INJ PANTOPRAZOLE SODIUM, VIA: HCPCS | Performed by: SURGERY

## 2022-04-11 PROCEDURE — 82948 REAGENT STRIP/BLOOD GLUCOSE: CPT

## 2022-04-11 PROCEDURE — 99239 HOSP IP/OBS DSCHRG MGMT >30: CPT | Performed by: INTERNAL MEDICINE

## 2022-04-11 RX ORDER — AMOXICILLIN AND CLAVULANATE POTASSIUM 875; 125 MG/1; MG/1
1 TABLET, FILM COATED ORAL 2 TIMES DAILY WITH MEALS
Qty: 8 TABLET | Refills: 0 | Status: SHIPPED | OUTPATIENT
Start: 2022-04-11 | End: 2022-04-15

## 2022-04-11 RX ADMIN — PREGABALIN 200 MG: 75 CAPSULE ORAL at 09:09

## 2022-04-11 RX ADMIN — HYDROCODONE BITARTRATE AND ACETAMINOPHEN 1 TABLET: 10; 325 TABLET ORAL at 13:57

## 2022-04-11 RX ADMIN — SODIUM CHLORIDE, PRESERVATIVE FREE 10 ML: 5 INJECTION INTRAVENOUS at 09:07

## 2022-04-11 RX ADMIN — METOPROLOL TARTRATE 25 MG: 50 TABLET, FILM COATED ORAL at 09:15

## 2022-04-11 RX ADMIN — INSULIN LISPRO 2 UNITS: 100 INJECTION, SOLUTION INTRAVENOUS; SUBCUTANEOUS at 12:14

## 2022-04-11 RX ADMIN — MORPHINE SULFATE 30 MG: 30 TABLET, FILM COATED, EXTENDED RELEASE ORAL at 06:44

## 2022-04-11 RX ADMIN — Medication 1 CAPSULE: at 09:14

## 2022-04-11 RX ADMIN — HYDROCODONE BITARTRATE AND ACETAMINOPHEN 1 TABLET: 10; 325 TABLET ORAL at 09:15

## 2022-04-11 RX ADMIN — PANTOPRAZOLE SODIUM 40 MG: 40 INJECTION, POWDER, FOR SOLUTION INTRAVENOUS at 09:16

## 2022-04-11 RX ADMIN — AMOXICILLIN AND CLAVULANATE POTASSIUM 1 TABLET: 875; 125 TABLET, FILM COATED ORAL at 09:14

## 2022-04-11 RX ADMIN — RISPERIDONE 1 MG: 1 TABLET ORAL at 09:15

## 2022-04-11 RX ADMIN — ENOXAPARIN SODIUM 40 MG: 100 INJECTION SUBCUTANEOUS at 09:16

## 2022-04-11 RX ADMIN — INSULIN LISPRO 2 UNITS: 100 INJECTION, SOLUTION INTRAVENOUS; SUBCUTANEOUS at 09:16

## 2022-04-11 RX ADMIN — NALOXEGOL OXALATE 12.5 MG: 12.5 TABLET, FILM COATED ORAL at 09:10

## 2022-04-11 RX ADMIN — ALOGLIPTIN 12.5 MG: 12.5 TABLET, FILM COATED ORAL at 09:14

## 2022-04-11 RX ADMIN — DULOXETINE HYDROCHLORIDE 30 MG: 30 CAPSULE, DELAYED RELEASE ORAL at 09:14

## 2022-04-11 ASSESSMENT — PAIN SCALES - WONG BAKER
WONGBAKER_NUMERICALRESPONSE: 0

## 2022-04-11 ASSESSMENT — PAIN SCALES - GENERAL
PAINLEVEL_OUTOF10: 2
PAINLEVEL_OUTOF10: 0
PAINLEVEL_OUTOF10: 7
PAINLEVEL_OUTOF10: 7
PAINLEVEL_OUTOF10: 5
PAINLEVEL_OUTOF10: 0

## 2022-04-11 ASSESSMENT — PAIN DESCRIPTION - ORIENTATION: ORIENTATION: LOWER

## 2022-04-11 ASSESSMENT — PAIN DESCRIPTION - PROGRESSION
CLINICAL_PROGRESSION: GRADUALLY WORSENING

## 2022-04-11 ASSESSMENT — PAIN DESCRIPTION - LOCATION: LOCATION: BACK

## 2022-04-11 ASSESSMENT — PAIN DESCRIPTION - DESCRIPTORS: DESCRIPTORS: ACHING

## 2022-04-11 ASSESSMENT — PAIN DESCRIPTION - FREQUENCY: FREQUENCY: CONTINUOUS

## 2022-04-11 ASSESSMENT — PAIN DESCRIPTION - PAIN TYPE: TYPE: CHRONIC PAIN

## 2022-04-11 NOTE — PLAN OF CARE
Problem: Pain:  Goal: Pain level will decrease  Description: Pain level will decrease  Outcome: Ongoing  Note: Pain assessed, patient rates 5/10,pain medication given, pain reassessed     Problem: Skin Integrity:  Goal: Will show no infection signs and symptoms  Description: Will show no infection signs and symptoms  Outcome: Ongoing  Note: No signs of infection.  Vitals WDL     Problem: Falls - Risk of:  Goal: Will remain free from falls  Description: Will remain free from falls  Outcome: Ongoing  Note: Call light within reach, fall education, patient to call for help when needs to do something, no falls     Problem: Discharge Planning:  Goal: Discharged to appropriate level of care  Description: Discharged to appropriate level of care  Outcome: Ongoing  Note: Patient on PO antibiotics, likely discharge home in AM

## 2022-04-11 NOTE — PROGRESS NOTES
Patient discharged at this time. All IV's removed. Discharge instructions, medication changes and follow up appointments explained at this time. All questions answered at this time. AVS given to patient and paperwork signed with this RN. All patient belongings returned. Chart broken down and placed in yellow bin. PADMINI drain in place. Patient educated on care and tracking output. Given CHG soap and instructed to use until it is gone. Patient left for lobby in wheelchair. Wife is driving him home.

## 2022-04-11 NOTE — DISCHARGE SUMMARY
Discharge Summary    Patient:  Shen Garzon  YOB: 1965    MRN: [id number]   Acct: [de-identified]    Primary Care Physician: Marcella Spivey DO    Admit date:  4/6/2022    Discharge date:  4/11/2022     Discharge Diagnoses:   Acute cholecystitis  Principal Problem:    Acute cholecystitis  Active Problems:    Diabetes mellitus ([address]    Hypertension    CAD (coronary artery disease)  Resolved Problems:    * No resolved hospital problems. *        Admitted for: (HPI) abdominal pain    Hospital Course: this is a pt with diabetes, cad, hbp who presented with RUQ pain to the ER. the Hospitalist Service was asked to admit the patient. Imaging and enzymes were c/w acute cholecystitis. He was seen by Dr Freddi Burkitt and cleared by Cardiology and went to surgery on 4.8 for cholecystectomy. Details are in her op note. He tolerated the procedure well and diet was advanced. He is being discharged today and will follow up with his family doctor and Dr Freddi Burkitt. In doing his discharge med reconciliation, it was noted he is on a DPP4 inhibitor and a GLP1 agonist.  The literature indicates there is very little added benefit to adding the DPP4 inhibitor and does not recommend it. He was also taking 2 different beta blockers, bisoprolol and metoprolol, neither at maximum dose. Consideration might be given to omitting one of these and maximizing the dose of one. Consultants:  Surgery, cardiology    Discharge Medications:       Medication List      START taking these medications    amoxicillin-clavulanate 875-125 MG per tablet  Commonly known as: AUGMENTIN  Take 1 tablet by mouth 2 times daily (with meals) for 4 days        CHANGE how you take these medications    morphine 30 MG extended release tablet  Commonly known as: MS CONTIN  What changed: Another medication with the same name was removed. Continue taking this medication, and follow the directions you see here.         CONTINUE taking these medications    aspirin 81 MG chewable tablet     atorvastatin 40 MG tablet  Commonly known as: LIPITOR     bisoprolol 5 MG tablet  Commonly known as: ZEBETA     desonide 0.05 % cream  Commonly known as: DESOWEN     DULoxetine 30 MG extended release capsule  Commonly known as: CYMBALTA     Farxiga 10 MG tablet  Generic drug: dapagliflozin     fluticasone 50 MCG/ACT nasal spray  Commonly known as: FLONASE     glimepiride 4 MG tablet  Commonly known as: AMARYL     HYDROcodone-acetaminophen  MG per tablet  Commonly known as: NORCO     ipratropium 0.03 % nasal spray  Commonly known as: ATROVENT     Januvia 100 MG tablet  Generic drug: SITagliptin     ketoconazole 2 % shampoo  Commonly known as: NIZORAL     LORazepam 0.5 MG tablet  Commonly known as: ATIVAN     Lyrica 200 MG capsule  Generic drug: pregabalin     meloxicam 15 MG tablet  Commonly known as: MOBIC     metoprolol succinate 50 MG extended release tablet  Commonly known as: TOPROL XL     montelukast 10 MG tablet  Commonly known as: SINGULAIR     pantoprazole 40 MG tablet  Commonly known as: PROTONIX     risperiDONE 1 MG tablet  Commonly known as: RISPERDAL     tamsulosin 0.4 MG capsule  Commonly known as: FLOMAX     Trelegy Ellipta 100-62.5-25 MCG/INH Aepb  Generic drug: fluticasone-umeclidin-vilant     TRULICITY SC           Where to Get Your Medications      These medications were sent to [address] [facility], [address] [place] [address] Fawn Claros [phone]  [address] [place] [address]    Phone: [phone]   · amoxicillin-clavulanate 875-125 MG per tablet           Physical Exam:    Vitals:  Vitals:    04/10/22 1505 04/10/22 2055 04/11/22 0335 04/11/22 0903   BP: (!) 140/89 (!) 161/84 138/78 (!) 140/80   Pulse: 72 73 61 85   Resp: 18 18 18 18   Temp: 97.7 °F (36.5 °C) 98 °F (36.7 °C) 98 °F (36.7 °C) 97.7 °F (36.5 °C)   TempSrc: Oral Oral Oral Oral   SpO2: 94% 92% 91% 93%   Weight:       Height:         Weight: Weight: 266 lb 12.8 oz (121 kg)     24 hour intake/output:    Intake/Output Summary (Last 24 hours) at 4/11/2022 1547  Last data filed at 4/11/2022 1403  Gross per 24 hour   Intake 608.95 ml   Output 130 ml   Net 478.95 ml       General appearance - alert, well appearing, and in no distress and overweight  Chest - clear to auscultation, no wheezes, rales or rhonchi, symmetric air entry  Heart - normal rate, regular rhythm, normal S1, S2, no murmurs, rubs, clicks or gallops  Abdomen - drain in place  Obese: Yes; Protuberant: Yes   Neurological - alert, oriented, normal speech, no focal findings or movement disorder noted  Extremities - peripheral pulses normal, no pedal edema, no clubbing or cyanosis  Skin - normal coloration and turgor, no rashes, no suspicious skin lesions noted    Procedures:    cholecystectomy            Labs can be found in the Lab tab of Chart Review    Diet:  ADULT DIET;  Regular; 4 carb choices (60 gm/meal)    Activity:  Activity as tolerated (Patient may move about with assist as indicated or with supervision.)    Follow-up:  in the next few days with Walter Lvoelace DO,  in the next few weeks with Dr Veronica Barker    Disposition: home    Condition: Stable      Time Spent: 35 minutes    Electronically signed by Jayden Agarwal MD on 4/11/2022 at 3:47 PM    Discharging Hospitalist

## 2022-04-11 NOTE — CARE COORDINATION
Discharge orders on chart. Met with Duane Holts and he is in agreement for discharge to home today with no services added/requested. Pt verbalized understanding and gave permission for possible discharge within 4 hours of receiving IMM. 4/11/22, 12:42 PM EDT    Patient goals/plan/ treatment preferences discussed by  and . Patient goals/plan/ treatment preferences reviewed with patient/ family. Patient/ family verbalize understanding of discharge plan and are in agreement with goal/plan/treatment preferences. Understanding was demonstrated using the teach back method. AVS provided by RN at time of discharge, which includes all necessary medical information pertaining to the patients current course of illness, treatment, post-discharge goals of care, and treatment preferences. IMM Letter  IMM Letter given to Patient/Family/Significant other/Guardian/POA/by[de-identified] Copy delivered to patient by Mgr. Rodas  IMM Letter date given[de-identified] 04/11/22  IMM Letter time given[de-identified] 6673

## 2022-04-12 NOTE — PROGRESS NOTES
Physician Progress Note      PATIENT:               Gisselle Levine  CSN #:                  [id number]  :                       1965  ADMIT DATE:       2022 12:19 PM  [address] DATE:        2022 2:15 PM  RESPONDING  PROVIDER #:        Ayaz Collado MD          QUERY TEXT:    Patient admitted with Sepsis/Gallbladder distention Gallstones cholecystitis. Noted documentation of Postop Acute Hypoxic Respiratory Failure by Internal   Med on  and Postoperative respiratory insufficiency by General surg on . If possible, please document in progress notes and discharge summary if you   are evaluating and /or treating any of the following: The medical record reflects the following:  Risk Factors: On 70% 60LPM  Clinical Indicators:  4-8 @ 1219 was 86 % on RA, place don 6L oxygen up to   92%, then placed on NRB at 15L and up to 91-96%, back to 91 % on 6 L oxygen on   48 @ 1245. at 1250 dropped to 90%, at 1326: placed on High flow oxygen up   to 96%. 4-8 @ 1500 : 94 % still on HIGH flow oxygen. 49 @ 0415 : 99 % still   on HIGH flow oxygen. 4-9 @ 0536 placed on 6L oxygen. 4-9 0845 on RA at 94%  Treatment: High flow oxygen    Thank you. Please call if you have any questions. P) [phone].   Signed   by Hussein Hopson RN Clinical , CRCR    Acute Respiratory Failure Clinical Indicators per 3M MS-DRG Training Guide and   Quick Reference Guide:  pO2 < 60 mmHg or SpO2 (pulse oximetry) < 91% breathing room air  pCO2 > 50 and pH < 7.35  P/F ratio (pO2 / FIO2) < 300  pO2 decrease or pCO2 increase by 10 mmHg from baseline (if known)  Supplemental oxygen of 40% or more  Presence of respiratory distress, tachypnea, dyspnea, shortness of breath,   wheezing  Unable to speak in complete sentences  Use of accessory muscles to breathe  Extreme anxiety and feeling of impending doom  Tripod position  Confusion/altered mental status/obtunded  Options provided:  -- Acute pulmonary insufficiency following surgery  -- Acute respiratory failure due to the surgery  -- Acute? Postoperative Pulmonary Insufficiency, Postoperative Respiratory   failure is ruled?out  -- Postoperative Respiratory failure is a complication of the procedure  -- Other - I will add my own diagnosis  -- Disagree - Not applicable / Not valid  -- Disagree - Clinically unable to determine / Unknown  -- Refer to Clinical Documentation Reviewer    PROVIDER RESPONSE TEXT:    This patient has acute postoperative pulmonary insufficiency.     Query created by: Niecy Willis on 4/11/2022 8:38 AM      Electronically signed by:  Karl Escalante MD 4/12/2022 8:48 AM

## 2022-04-14 ENCOUNTER — OFFICE VISIT (OUTPATIENT)
Dept: SURGERY | Age: 57
End: 2022-04-14

## 2022-04-14 VITALS
DIASTOLIC BLOOD PRESSURE: 80 MMHG | BODY MASS INDEX: 39.86 KG/M2 | OXYGEN SATURATION: 95 % | WEIGHT: 263 LBS | HEART RATE: 76 BPM | SYSTOLIC BLOOD PRESSURE: 127 MMHG | RESPIRATION RATE: 15 BRPM | HEIGHT: 68 IN | TEMPERATURE: 97.2 F

## 2022-04-14 DIAGNOSIS — K81.0 ACUTE CHOLECYSTITIS: Primary | ICD-10-CM

## 2022-04-14 PROCEDURE — 99024 POSTOP FOLLOW-UP VISIT: CPT | Performed by: SURGERY

## 2022-04-14 NOTE — PROGRESS NOTES
Mayur Collins MD   General Surgery  Postprocedure Evaluation in Office  Pt Name: Mohinder Willson  Date of Birth 1965   Today's Date: 4/14/2022  Medical Record Number: [id number]  Primary Care Provider: Vidya Dangelo DO  Chief Complaint   Patient presents with   Dossie Radha Post-Op Check     s/p Robotic assisted laparoscopic cholecystectomy with ICG green cholangiography with 19 Niko drain right upper quadrant. Drain removal     ASSESSMENT      1. Acute cholecystitis    2. Postop     PLAN       1. Drain removed  2. No lifting over 30 pounds  3. Follow-up office 4 weeks    4. Follow-up hepatic function tests in 2 weeks prior to next visit. Berl Cranker is seen today for post-op follow-up. He is status post laparoscopic cholecystectomy for a severe acute gangrenous cholecystitis. Right upper quadrant drain placed. He had some questionable small stones in his gallbladder as well as common duct. Considered ERCP but liver function tests improved. Surgery was technically very difficult. Sclera appear anicteric drain output is serous and removed without complication. He denies any fever or chills appetite is good. Denies any chronic diarrhea.     Medications    Current Outpatient Medications:     amoxicillin-clavulanate (AUGMENTIN) 875-125 MG per tablet, Take 1 tablet by mouth 2 times daily (with meals) for 4 days, Disp: 8 tablet, Rfl: 0    metoprolol succinate (TOPROL XL) 50 MG extended release tablet, Take 50 mg by mouth daily, Disp: , Rfl:     fluticasone-umeclidin-vilant (TRELEGY ELLIPTA) 100-62.5-25 MCG/INH AEPB, Inhale 1 puff into the lungs daily, Disp: , Rfl:     fluticasone (FLONASE) 50 MCG/ACT nasal spray, 1 spray by Each Nostril route daily, Disp: , Rfl:     Dulaglutide (TRULICITY SC), Inject into the skin once a week Patient takes each Sunday evening, Disp: , Rfl:     glimepiride (AMARYL) 4 MG tablet, Take 4 mg by mouth every morning (before breakfast), Disp: , Rfl:    risperiDONE (RISPERDAL) 1 MG tablet, Take 1 mg by mouth daily, Disp: , Rfl:     DULoxetine (CYMBALTA) 30 MG extended release capsule, Take 30 mg by mouth daily, Disp: , Rfl:     HYDROcodone-acetaminophen (NORCO)  MG per tablet, Take 1 tablet by mouth in the morning, at noon, and at bedtime. , Disp: , Rfl:     pregabalin (LYRICA) 200 MG capsule, Take 200 mg by mouth in the morning, at noon, and at bedtime. , Disp: , Rfl:     montelukast (SINGULAIR) 10 MG tablet, Take 10 mg by mouth nightly, Disp: , Rfl:     tamsulosin (FLOMAX) 0.4 MG capsule, Take 0.4 mg by mouth at bedtime , Disp: , Rfl:     atorvastatin (LIPITOR) 40 MG tablet, Take 40 mg by mouth nightly , Disp: , Rfl:     pantoprazole (PROTONIX) 40 MG tablet, Take 40 mg by mouth 2 times daily , Disp: , Rfl:     bisoprolol (ZEBETA) 5 MG tablet, Take 5 mg by mouth at bedtime , Disp: , Rfl:     SITagliptin (JANUVIA) 100 MG tablet, Take 100 mg by mouth at bedtime , Disp: , Rfl:     LORazepam (ATIVAN) 0.5 MG tablet, Take 0.5 mg by mouth at bedtime. , Disp: , Rfl:     meloxicam (MOBIC) 15 MG tablet, Take 15 mg by mouth daily, Disp: , Rfl:     ketoconazole (NIZORAL) 2 % shampoo, Apply topically daily as needed for Itching Apply topically daily as needed. , Disp: , Rfl:     desonide (DESOWEN) 0.05 % cream, Apply topically 2 times daily Apply topically 2 times daily. , Disp: , Rfl:     ipratropium (ATROVENT) 0.03 % nasal spray, 2 sprays by Each Nostril route daily, Disp: , Rfl:     aspirin 81 MG chewable tablet, Take 81 mg by mouth daily, Disp: , Rfl:     morphine (MS CONTIN) 30 MG extended release tablet, Take 30 mg by mouth in the morning, at noon, and at bedtime.  Morning, noon and bedtime, Disp: , Rfl:     dapagliflozin (FARXIGA) 10 MG tablet, Take 10 mg by mouth every morning (Patient not taking: Reported on 4/6/2022), Disp: , Rfl:     Allergies  Allergies   Allergen Reactions    Abilify [Aripiprazole]     Metformin And Related        Review of Systems  History obtained from the patient. Constitutional: Denies any fever, chills, fatigue. Wound: Denies any rash, skin color changes or wound problems. Resp: Denies any cough, shortness of breath. CV: Denies any chest pain, orthopnea or syncope. GI: Positive for minimal incisional discomfort only. Denies any nausea, vomiting, blood in the stool, constipation or diarrhea. : Denies any hematuria, hesitancy or dysuria. OBJECTIVE     VITALS: /80 (Site: Right Upper Arm, Position: Sitting, Cuff Size: Medium Adult)   Pulse 76   Temp 97.2 °F (36.2 °C) (Tympanic)   Resp 15   Ht 5' 8\" (1.727 m)   Wt 263 lb (119.3 kg)   SpO2 95%   BMI 39.99 kg/m²     CONSTITUTIONAL: Alert and oriented times 3, no acute distress and cooperative to examination. SKIN: Skin color, texture, turgor normal. No rashes or lesions. INCISION: wound margins intact and healing well. No signs of infection. No drainage.   LUNGS: Lungs Clear  CARDIOVASCULAR: Normal Rate  ABDOMEN: Soft minimal incisional tenderness drain output serous  NEUROLOGIC: No sensory or motor nerve irritation        Performed by: [facility] Zachariah Le. Pathology     Isabel Lewis                 [id number]   Assoc.                                              Page 1 of 1   [address]   [address], [place] [address]                                                       PROC: 2022   [facility]                                    RECV: 2022   [facility]                                    RPTD: 2022   [address], [place] [address]                       MRN:  [id number]    LOC: 5KS                       ACCT: [id number]  SEX: M                       : 1965  AGE: 61 Y                          PATHOLOGY REPORT                       ATTN: CHRISTIANO THOMAS                       REQ: [address] [facility] OLT       Copies To:   EDILMA [facility]; Chapis THAKKAR       Clinical Information: GALLBLADDER DISTENTION, GALLSTONES     FINAL DIAGNOSIS:   Gallbladder,

## 2022-05-05 ENCOUNTER — HOSPITAL ENCOUNTER (OUTPATIENT)
Age: 57
Discharge: HOME | End: 2022-05-05
Payer: MEDICARE

## 2022-05-05 DIAGNOSIS — K81.0 ACUTE CHOLECYSTITIS: ICD-10-CM

## 2022-05-05 PROCEDURE — 80076 HEPATIC FUNCTION PANEL: CPT

## 2022-05-05 PROCEDURE — 36415 COLL VENOUS BLD VENIPUNCTURE: CPT

## 2022-05-06 LAB
ALBUMIN SERPL-MCNC: 4.4 G/DL (ref 3.5–5.1)
ALP BLD-CCNC: 130 U/L (ref 38–126)
ALT SERPL-CCNC: 49 U/L (ref 11–66)
AST SERPL-CCNC: 30 U/L (ref 5–40)
BILIRUB SERPL-MCNC: 0.9 MG/DL (ref 0.3–1.2)
BILIRUBIN DIRECT: 0.3 MG/DL (ref 0–0.3)
TOTAL PROTEIN: 7.4 G/DL (ref 6.1–8)

## 2022-05-11 NOTE — PROGRESS NOTES
Beronica Park MD   General Surgery  Postprocedure Evaluation in Office  Pt Name: Tina Coburn  Date of Birth 1965   Today's Date: 5/12/2022  Medical Record Number: [id number]  Primary Care Provider: Franc Lora DO  Chief Complaint   Patient presents with    1 Month Follow-Up     s/p Robotic assisted laparoscopic cholecystectomy with ICG green cholangiography on 4-8-22. Hepatic panel completed 5-5-22. ASSESSMENT      1. Acute cholecystitis    2. Primary hypertension    3. Type 2 diabetes mellitus with hyperglycemia, with long-term current use of insulin ([address]    4. Postop check         PLAN       1. Patient reports feeling really well. No chronic diarrhea. 2.  Activity as tolerated  3. Paddock function tests essentially normalized    4. Cardiology follow-up in 2 weeks. 5.  Follow-up surgical clinic as needed. Call with any questions or concerns. Susanna Bender is seen today for post-op follow-up. He is status post laparoscopic cholecystectomy for a severe acute gangrenous cholecystitis. Right upper quadrant drain placed. He had some questionable small stones in his gallbladder as well as common duct. Considered ERCP but liver function tests improved. Surgery was technically very difficult. Sclera appear anicteric drain output is serous and removed without complication. He denies any fever or chills appetite is good. Denies any chronic diarrhea. History: Follow-up liver functions almost normalized. Patient states he feels great denies any chronic diarrhea. Some symptoms he was relating to other things he thinks were really his gallbladder. Abdomen benign on examination and incisions are healing well.     Medications    Current Outpatient Medications:     metoprolol succinate (TOPROL XL) 50 MG extended release tablet, Take 50 mg by mouth daily, Disp: , Rfl:     fluticasone-umeclidin-vilant (TRELEGY ELLIPTA) 100-62.5-25 MCG/INH AEPB, Inhale 1 puff into the lungs daily, Disp: , Rfl:     fluticasone (FLONASE) 50 MCG/ACT nasal spray, 1 spray by Each Nostril route daily, Disp: , Rfl:     Dulaglutide (TRULICITY SC), Inject into the skin once a week Patient takes each Sunday evening, Disp: , Rfl:     glimepiride (AMARYL) 4 MG tablet, Take 4 mg by mouth every morning (before breakfast), Disp: , Rfl:     risperiDONE (RISPERDAL) 1 MG tablet, Take 1 mg by mouth daily, Disp: , Rfl:     DULoxetine (CYMBALTA) 30 MG extended release capsule, Take 30 mg by mouth daily, Disp: , Rfl:     HYDROcodone-acetaminophen (NORCO)  MG per tablet, Take 1 tablet by mouth in the morning, at noon, and at bedtime. , Disp: , Rfl:     pregabalin (LYRICA) 200 MG capsule, Take 200 mg by mouth in the morning, at noon, and at bedtime. , Disp: , Rfl:     montelukast (SINGULAIR) 10 MG tablet, Take 10 mg by mouth nightly, Disp: , Rfl:     tamsulosin (FLOMAX) 0.4 MG capsule, Take 0.4 mg by mouth at bedtime , Disp: , Rfl:     atorvastatin (LIPITOR) 40 MG tablet, Take 40 mg by mouth nightly , Disp: , Rfl:     pantoprazole (PROTONIX) 40 MG tablet, Take 40 mg by mouth 2 times daily , Disp: , Rfl:     dapagliflozin (FARXIGA) 10 MG tablet, Take 10 mg by mouth every morning , Disp: , Rfl:     bisoprolol (ZEBETA) 5 MG tablet, Take 5 mg by mouth at bedtime , Disp: , Rfl:     SITagliptin (JANUVIA) 100 MG tablet, Take 100 mg by mouth at bedtime , Disp: , Rfl:     LORazepam (ATIVAN) 0.5 MG tablet, Take 0.5 mg by mouth at bedtime. , Disp: , Rfl:     meloxicam (MOBIC) 15 MG tablet, Take 15 mg by mouth daily, Disp: , Rfl:     ketoconazole (NIZORAL) 2 % shampoo, Apply topically daily as needed for Itching Apply topically daily as needed. , Disp: , Rfl:     desonide (DESOWEN) 0.05 % cream, Apply topically 2 times daily Apply topically 2 times daily. , Disp: , Rfl:     ipratropium (ATROVENT) 0.03 % nasal spray, 2 sprays by Each Nostril route daily, Disp: , Rfl:     aspirin 81 MG chewable tablet, Take 81 mg by mouth daily, Disp: , Rfl:     morphine (MS CONTIN) 30 MG extended release tablet, Take 30 mg by mouth in the morning, at noon, and at bedtime. Morning, noon and bedtime, Disp: , Rfl:     Allergies  Allergies   Allergen Reactions    Abilify [Aripiprazole]     Metformin And Related        Review of Systems  History obtained from the patient. Constitutional: Denies any fever, chills, fatigue. Wound: Denies any rash, skin color changes or wound problems. Resp: Denies any cough, shortness of breath. CV: Denies any chest pain, orthopnea or syncope. GI: Positive for minimal incisional discomfort only. Denies any nausea, vomiting, blood in the stool, constipation or diarrhea. : Denies any hematuria, hesitancy or dysuria. OBJECTIVE     VITALS: /72   Pulse 64   Temp 98.1 °F (36.7 °C) (Temporal)   Resp 18   Ht 5' 8\" (1.727 m)   Wt 262 lb 3.2 oz (118.9 kg)   SpO2 95%   BMI 39.87 kg/m²     CONSTITUTIONAL: Alert and oriented times 3, no acute distress and cooperative to examination. SKIN: Skin color, texture, turgor normal. No rashes or lesions. INCISION: wound margins intact and healing well. No signs of infection. No drainage. LUNGS: Lungs Clear  CARDIOVASCULAR: Normal Rate  ABDOMEN: Soft decisions well-healed. No evidence of hernia.   NEUROLOGIC: No sensory or motor nerve irritation        Performed by: [address] [place] [address] Mimi. Pathology     [place], [place]                 [id number]   Assoc.                                              Page 1 of 0 [address].[place], [place] [address]                                                       PROC: 2022   [facility]                                    RECV: 2022   [address]                                    RPTD: 2022   [address], [place] [address]                       MRN:  [id number]    LOC: 5KS                       ACCT: [de-identified]  SEX: M                       : 1965  AGE: 61 Y                          PATHOLOGY REPORT                     ATTN: CHRISTIANOCALLY THOMAS                       REQ: [address] [facility] OLT       Copies To:   EDILMA [address] [facility] [address]; Lily THAKKAR       Clinical Information: GALLBLADDER DISTENTION, GALLSTONES     FINAL DIAGNOSIS:   Gallbladder, cholecystectomy:             Cholelithiasis and chronic cholecystitis. Specimen:   GALLBLADDER       Gross Examination:   The container is labeled Gabe Fresh, gallbladder.  Received in   formalin is an enlarged gallbladder specimen.  The specimen measures 18   x 5.5 x 1.3 cm and is grossly deflated.  The cystic duct region has   clips.  The serosa is erythematous and smooth.  The specimen is opened   demonstrating granular, tan mucosa and a few small dark brown calculi,   which measure in size from 2 mm up to 5 mm.  The wall measures 0.2 cm   in maximum thickness and is without masses or lesions.  Representative   sections taken through the wall and cystic duct are submitted in a   total of one cassette.  EKM:[id number]     Microscopic Examination:   Microscopic examination was performed. [id number]                                                       <Sign Out Dr. Romayne Cord Denis Ralph, M.D., F.C.A.P.        [facility]  Printed on:  4/11/2022   [address] [employer]   Original print date: 04/11/2022      Specimen Collected: 04/08/22 12:45 Last Resulted: 04/11/22 12:06               Component Ref Range & Units 5/5/22 1415 4/10/22 1015 4/9/22 0420 4/8/22 0921 4/8/22 0608 4/8/22 0608 4/8/22 0608 4/8/22 0608 4/8/22 0608   Albumin 3.5 - 5.1 g/dL 4.4  3.7  3.3 Low        3.6 CM    Total Bilirubin 0.3 - 1.2 mg/dL 0.9  1.4 High   1.8 High   3.5 High  CM         Bilirubin, Direct 0.0 - 0.3 mg/dL 0.3  0.7 High            Alkaline Phosphatase 38 - 126 U/L 130 High   163 High   202 High       210 High  CM     AST 5 - 40 U/L 30  76 High   143 High     197 High  CM       ALT 11 - 66 U/L 49  194 High   272 High  CM 311 High  CM      Total Protein 6.1 - 8.0 g/dL 7.4  7.0 CM  6.8   6.6 CM        Comment: Performed at [address] [place] [id number]   Glucose    176 High  R          CREATININE    0.7 R          BUN    11 R          Sodium    131 Low  R          Potassium reflex Magnesium    4.2 R          Chloride    98 R          CO2    21 Low  R          Calcium    8.5 R          Resulting Agency  [address] [facility]

## 2022-05-12 ENCOUNTER — OFFICE VISIT (OUTPATIENT)
Dept: SURGERY | Age: 57
End: 2022-05-12

## 2022-05-12 VITALS
SYSTOLIC BLOOD PRESSURE: 134 MMHG | HEART RATE: 64 BPM | BODY MASS INDEX: 39.74 KG/M2 | WEIGHT: 262.2 LBS | OXYGEN SATURATION: 95 % | HEIGHT: 68 IN | RESPIRATION RATE: 18 BRPM | TEMPERATURE: 98.1 F | DIASTOLIC BLOOD PRESSURE: 72 MMHG

## 2022-05-12 DIAGNOSIS — K81.0 ACUTE CHOLECYSTITIS: Primary | ICD-10-CM

## 2022-05-12 DIAGNOSIS — I10 PRIMARY HYPERTENSION: ICD-10-CM

## 2022-05-12 DIAGNOSIS — Z09 POSTOP CHECK: ICD-10-CM

## 2022-05-12 DIAGNOSIS — Z79.4 TYPE 2 DIABETES MELLITUS WITH HYPERGLYCEMIA, WITH LONG-TERM CURRENT USE OF INSULIN (HCC): ICD-10-CM

## 2022-05-12 DIAGNOSIS — E11.65 TYPE 2 DIABETES MELLITUS WITH HYPERGLYCEMIA, WITH LONG-TERM CURRENT USE OF INSULIN (HCC): ICD-10-CM

## 2022-05-12 PROCEDURE — 99024 POSTOP FOLLOW-UP VISIT: CPT | Performed by: SURGERY

## 2022-05-26 ENCOUNTER — OFFICE VISIT (OUTPATIENT)
Dept: CARDIOLOGY CLINIC | Age: 57
End: 2022-05-26
Payer: MEDICARE

## 2022-05-26 VITALS
HEART RATE: 68 BPM | WEIGHT: 269 LBS | HEIGHT: 68 IN | DIASTOLIC BLOOD PRESSURE: 80 MMHG | BODY MASS INDEX: 40.77 KG/M2 | SYSTOLIC BLOOD PRESSURE: 132 MMHG

## 2022-05-26 DIAGNOSIS — I10 HYPERTENSION, UNSPECIFIED TYPE: ICD-10-CM

## 2022-05-26 DIAGNOSIS — Z95.1 HX OF CABG: Primary | ICD-10-CM

## 2022-05-26 PROCEDURE — 99214 OFFICE O/P EST MOD 30 MIN: CPT | Performed by: INTERNAL MEDICINE

## 2022-05-26 RX ORDER — BISOPROLOL FUMARATE 10 MG/1
10 TABLET ORAL DAILY
Qty: 90 TABLET | Refills: 3 | Status: SHIPPED | OUTPATIENT
Start: 2022-05-26

## 2022-05-26 RX ORDER — LISINOPRIL 10 MG/1
10 TABLET ORAL DAILY
Qty: 90 TABLET | Refills: 1 | Status: SHIPPED | OUTPATIENT
Start: 2022-05-26

## 2022-05-26 NOTE — PROGRESS NOTES
[address] [facility] [place] [address] [place] [address]  Dept: [phone]  Dept Fax: [phone]  Loc: [phone]    Visit Date: 5/26/2022    Mr. Thomas Denver is a 61 y.o. male  who presented for:  Chief Complaint   Patient presents with    New Patient       HPI:   HPI   62 yo M hx of DM II, s/p CABG 2011, had GB surgery 4/2021 who presents to [facility] care. Taking all meds. He is on Bisoprolol and Metoprolol. HR in the 60s. No chest pain, angina, BRANTLEY, orthopnea, PND, sob at rest, palpitations, LE edema, or syncope. Can do all ADLs. EF 50%. ECG with NSR. No a/t/d. He quit smoking 11 years. CABG [facility] at [facility] by [facility]. Taking PPI. No major family hx. He has no active cardiac complaints. He is on disability for back. He is on ASA as well. No bleeding. Current Outpatient Medications:     metoprolol succinate (TOPROL XL) 50 MG extended release tablet, Take 50 mg by mouth daily, Disp: , Rfl:     fluticasone-umeclidin-vilant (TRELEGY ELLIPTA) 100-62.5-25 MCG/INH AEPB, Inhale 1 puff into the lungs daily, Disp: , Rfl:     fluticasone (FLONASE) 50 MCG/ACT nasal spray, 1 spray by Each Nostril route daily, Disp: , Rfl:     Dulaglutide (TRULICITY SC), Inject into the skin once a week Patient takes each Sunday evening, Disp: , Rfl:     glimepiride (AMARYL) 4 MG tablet, Take 4 mg by mouth every morning (before breakfast), Disp: , Rfl:     risperiDONE (RISPERDAL) 1 MG tablet, Take 1 mg by mouth daily, Disp: , Rfl:     DULoxetine (CYMBALTA) 30 MG extended release capsule, Take 30 mg by mouth daily, Disp: , Rfl:     HYDROcodone-acetaminophen (NORCO)  MG per tablet, Take 1 tablet by mouth in the morning, at noon, and at bedtime. , Disp: , Rfl:     pregabalin (LYRICA) 200 MG capsule, Take 200 mg by mouth in the morning, at noon, and at bedtime. , Disp: , Rfl:     montelukast (SINGULAIR) 10 MG tablet, Take 10 mg by mouth nightly, Disp: , Rfl:     tamsulosin (FLOMAX) 0.4 MG capsule, Take 0.4 mg by mouth at bedtime , Disp: , Rfl:     atorvastatin (LIPITOR) 40 MG tablet, Take 40 mg by mouth nightly , Disp: , Rfl:     pantoprazole (PROTONIX) 40 MG tablet, Take 40 mg by mouth 2 times daily , Disp: , Rfl:     dapagliflozin (FARXIGA) 10 MG tablet, Take 10 mg by mouth every morning , Disp: , Rfl:     bisoprolol (ZEBETA) 5 MG tablet, Take 5 mg by mouth at bedtime , Disp: , Rfl:     SITagliptin (JANUVIA) 100 MG tablet, Take 100 mg by mouth at bedtime , Disp: , Rfl:     LORazepam (ATIVAN) 0.5 MG tablet, Take 0.5 mg by mouth at bedtime. , Disp: , Rfl:     meloxicam (MOBIC) 15 MG tablet, Take 15 mg by mouth daily, Disp: , Rfl:     ketoconazole (NIZORAL) 2 % shampoo, Apply topically daily as needed for Itching Apply topically daily as needed. , Disp: , Rfl:     desonide (DESOWEN) 0.05 % cream, Apply topically 2 times daily Apply topically 2 times daily. , Disp: , Rfl:     ipratropium (ATROVENT) 0.03 % nasal spray, 2 sprays by Each Nostril route daily, Disp: , Rfl:     aspirin 81 MG chewable tablet, Take 81 mg by mouth daily, Disp: , Rfl:     morphine (MS CONTIN) 30 MG extended release tablet, Take 30 mg by mouth in the morning, at noon, and at bedtime. Morning, noon and bedtime, Disp: , Rfl:     Past Medical History  Pratibha Garcia  has a past medical history of CAD (coronary artery disease), Diabetes mellitus ([address] Hyperlipidemia, Hypertension, Movement disorder, Neuromuscular disorder ([address] Pneumonia, and Psychiatric problem. Social History  Pratibha Garcia  reports that he has never smoked. He has never used smokeless tobacco. He reports current alcohol use. He reports that he does not use drugs. Family History  Pratibha Garcia family history is not on file. There is no family history of bicuspid aortic valve, aneurysms, heart transplant, pacemakers, defibrillators, or sudden cardiac death.       Past Surgical History   Past Surgical History: Procedure Laterality Date    BACK SURGERY      CARDIAC SURGERY      CHOLECYSTECTOMY, LAPAROSCOPIC N/A 4/8/2022    Robotic Laparoscopic Cholecystectomy performed by Abhinav Shelton MD at [address] [place], DIAGNOSTIC         Review of Systems   Constitutional: Negative for chills and fever  HENT: Negative for congestion, sinus pressure, sneezing and sore throat. Eyes: Negative for pain, discharge, redness and itching. Respiratory: Negative for apnea, cough  Gastrointestinal: Negative for blood in stool, constipation, diarrhea   Endocrine: Negative for cold intolerance, heat intolerance, polydipsia. Genitourinary: Negative for dysuria, enuresis, flank pain and hematuria. Musculoskeletal: Negative for arthralgias, joint swelling and neck pain. Neurological: Negative for numbness and headaches. Psychiatric/Behavioral: Negative for agitation, confusion, decreased concentration and dysphoric mood. Objective:     /80   Pulse 68   Ht 5' 8\" (1.727 m)   Wt 269 lb (122 kg)   BMI 40.90 kg/m²     Wt Readings from Last 3 Encounters:   05/26/22 269 lb (122 kg)   05/12/22 262 lb 3.2 oz (118.9 kg)   04/14/22 263 lb (119.3 kg)     BP Readings from Last 3 Encounters:   05/26/22 132/80   05/12/22 134/72   04/14/22 127/80       Nursing note and vitals reviewed. Physical Exam   Constitutional: Oriented to person, place, and time. Appears well-developed and well-nourished. HENT:   Head: Normocephalic and atraumatic. Eyes: EOM are normal. Pupils are equal, round, and reactive to light. Neck: Normal range of motion. Neck supple. No JVD present. Cardiovascular: Normal rate, regular rhythm, normal heart sounds and intact distal pulses. No murmur heard. Pulmonary/Chest: Effort normal and breath sounds normal. No respiratory distress. No wheezes. No rales. Abdominal: Soft. Bowel sounds are normal. No distension. There is no tenderness.    Musculoskeletal: Normal range of motion. No edema. Neurological: Alert and oriented to person, place, and time. No cranial nerve deficit. Coordination normal.   Skin: Skin is warm and dry. Psychiatric: Normal mood and affect.        No results found for: CKTOTAL, CKMB, CKMBINDEX    Lab Results   Component Value Date    WBC 9.7 04/09/2022    RBC 5.46 04/09/2022    HGB 16.3 04/09/2022    HCT 50.3 04/09/2022    MCV 92.1 04/09/2022    MCH 29.9 04/09/2022    MCHC 32.4 04/09/2022     04/09/2022    MPV 10.1 04/09/2022       Lab Results   Component Value Date     04/10/2022    K 3.6 04/10/2022    K 4.2 04/09/2022     04/10/2022    CO2 25 04/10/2022    BUN 13 04/10/2022    LABALBU 4.4 05/05/2022    CREATININE 0.7 04/10/2022    CALCIUM 8.8 04/10/2022    LABGLOM >90 04/10/2022    GLUCOSE 249 04/10/2022       Lab Results   Component Value Date    ALKPHOS 130 05/05/2022    ALT 49 05/05/2022    AST 30 05/05/2022    PROT 7.4 05/05/2022    BILITOT 0.9 05/05/2022    BILIDIR 0.3 05/05/2022    LABALBU 4.4 05/05/2022       No results found for: MG    No results found for: INR, PROTIME      Lab Results   Component Value Date    LABA1C 7.7 04/06/2022       No results found for: TRIG, HDL, LDLCALC, LDLDIRECT, LABVLDL    No results found for: TSH      Testing Reviewed:      I have individually reviewed the cardiac test below:    ECHO: Results for orders placed during the hospital encounter of 04/06/22    ECHO Complete 2D W Doppler W Color    Narrative  Transthoracic Echocardiography Report (TTE)    Demographics    Patient Name   Gege Godwin  Gender              Male  R    MR #           [id number]       Race                    Ethnicity    Account #      [de-identified]       Room Number         1225    Accession      [id number]      Date of Study       04/07/2022  Number    Date of Birth  1965      Referring Physician Eli Jha DO    Age            61 year(s)      Sonographer         Racquel James [facility],  RVT    Interpreting Tillman Cogan MD  Physician    Procedure    Type of Study    TTE procedure:ECHOCARDIOGRAM COMPLETE 2D W DOPPLER W COLOR. Procedure Date  Date: 04/07/2022 Start: 02:54 PM    Study Location: Bedside  Technical Quality: Limited visualization due to body habitus. Indications:Abnormal ECG and ECG changes. Additional Medical History:coronary artery disease, CABG, GERD, diabetes,  hypertension, hyperlipidemia    Patient Status: Routine    Height: 68 inches Weight: 270.01 pounds BSA: 2.32 m^2 BMI: 41.05 kg/m^2    BP: 157/88 mmHg    Conclusions    Summary  Technically difficult examination. Left ventricular size is normal and systolic function is mildly reduced. Ejection fraction was estimated at 50%. LV wall thickness is within normal  limits. The right ventricular size appears normal with normal systolic function  and wall thickness. Signature    ----------------------------------------------------------------  Electronically signed by Tillman Cogan MD (Interpreting  physician) on 04/07/2022 at 04:01 PM  ----------------------------------------------------------------    Findings    Mitral Valve  The mitral valve structure is normal with normal leaflet separation. DOPPLER: The transmitral velocity was within the normal range with no  evidence for mitral stenosis. There was no evidence of mitral  regurgitation. Aortic Valve  The aortic valve appears trileaflet with normal thickness and leaflet  excursion. DOPPLER: Transaortic velocity was within the normal range with  no evidence of aortic stenosis. There was no evidence of aortic  regurgitation. Tricuspid Valve  The tricuspid valve structure is normal with normal leaflet separation. DOPPLER: There is no evidence of tricuspid stenosis. There was no evidence  of tricuspid regurgitation. Pulmonic Valve  The pulmonic valve was not well visualized .     Left Atrium  Left atrial size is normal.    Left Ventricle  Left ventricular size is normal and systolic function is mildly reduced. Ejection fraction was estimated at 50%. LV wall thickness is within normal  limits. Right Atrium  Right atrial size was normal.    Right Ventricle  The right ventricular size appears normal with normal systolic function  and wall thickness. Pericardial Effusion  The pericardium appears normal with no evidence of a pericardial effusion. Pleural Effusion  No evidence of pleural effusion. Aorta / Great Vessels  -Aortic root dimension within normal limits. -IVC size is within normal limits with normal respiratory phasic changes.     M-Mode/2D Measurements & Calculations    LV Diastolic    LV Systolic Dimension: 3.4  AV Cusp Separation: 2.1 cmLA  Dimension: 4.3  cm                          Dimension: 3.2 cmAO Root  cm              LV Volume Diastolic: 76.1   Dimension: 3.9 cm  LV FS:20.9 %    ml  LV PW           LV Volume Systolic: 77.4 ml  Diastolic: 1.5  LV EDV/LV EDV Index: 79.5  cm              ml/34 m^2LV ESV/LV ESV      RV Diastolic Dimension: 3 cm  Septum          Index: 39.3 PX/14 m^2  Diastolic: 1.1  EF Calculated: 50.6 %       LA/Aorta: 0.82  cm    Doppler Measurements & Calculations    MV Peak E-Wave: 62.1     AV Peak Velocity: 102  LVOT Peak Velocity: 82.7  cm/s                     cm/s                   cm/s  MV Peak A-Wave: 58.7     AV Peak Gradient: 4.16 LVOT Peak Gradient: 3 mmHg  cm/s                     mmHg  MV E/A Ratio: 1.06                              TV Peak E-Wave: 38.6 cm/s  MV Peak Gradient: 1.54                          TV Peak A-Wave: 49.3 cm/s  mmHg  TV Peak Gradient: 0.6 mmHg  MV Deceleration Time:                           TR Velocity:240 cm/s  128 msec                                        TR Gradient:23.04 mmHg  MV P1/2t: 37 msec                               PV Peak Velocity: 72 cm/s  MVA by PHT:5.95 cm^2     AV DVI (Vmax):0.81     PV Peak Gradient: 2.07  mmHg    [email or web address]%3d       Assessment/Plan   Hx of CABG x 4 (LIMA to LAD, SVG to PDA, SVG to OM1, SVG to RCA) - 2011  EF 50%, NYHA I  DM II  HTN  Hx of cholecystectomy - 4/2021  D/c Metoprolol, increase Bisprolol, add Lisinopril 10 mg q day. Continue ASA/Statin. No other major issues today. Continue PPI. RF management. He is already on [place] and doing well with this. If need another medication, consider Aldactone. The patient was advised on risk/benefits of the new Rx and he agreed to proceed with the medication(s). Discussed diet/exercise/BP/weight loss/health lifestyle choices/lipids; the patient understands the goals and will try to comply.       Disposition:  1 year    Electronically signed by Ryan Bennett MD   5/26/2022 at 9:03 AM EDT

## 2023-06-23 ENCOUNTER — OFFICE VISIT (OUTPATIENT)
Dept: CARDIOLOGY CLINIC | Age: 58
End: 2023-06-23

## 2023-06-23 VITALS
DIASTOLIC BLOOD PRESSURE: 60 MMHG | SYSTOLIC BLOOD PRESSURE: 108 MMHG | BODY MASS INDEX: 38.28 KG/M2 | HEIGHT: 70 IN | WEIGHT: 267.4 LBS | HEART RATE: 69 BPM

## 2023-06-23 DIAGNOSIS — Z95.1 HX OF CABG: Primary | ICD-10-CM

## 2023-06-23 DIAGNOSIS — I10 HYPERTENSION, UNSPECIFIED TYPE: ICD-10-CM

## 2023-06-23 DIAGNOSIS — I25.5 ISCHEMIC CARDIOMYOPATHY: ICD-10-CM

## 2023-06-23 RX ORDER — TIZANIDINE 4 MG/1
4 TABLET ORAL EVERY 6 HOURS PRN
COMMUNITY

## 2023-06-23 NOTE — PROGRESS NOTES
1 year follow-up. He states intermittently he gets short of breath and palpitations. He denies having any chest pain. EKG completed.

## 2023-06-23 NOTE — PROGRESS NOTES
[address] [address] [place] [address]  Dept: [phone]  Dept Fax: [phone]  Loc: [phone]    Visit Date: 6/23/2023    Primary Cardiologist: Edilia Yancey MD    Mr. Argentina Jacinto is a 61 y.o. male  who presented for: 1 year follow-up    Chief Complaint   Patient presents with    1 Year Follow Up    Coronary Artery Disease       HPI:   HPI   Last seen in office on 5/6/22 per Dr. Samir Lu. Per office note:  60 yo M hx of DM II, s/p CABG 2011, had GB surgery 4/2021 who presents to [facility] care. Taking all meds. He is on Bisoprolol and Metoprolol. HR in the 60s. No chest pain, angina, BRANTLEY, orthopnea, PND, sob at rest, palpitations, LE edema, or syncope. Can do all ADLs. EF 50%. ECG with NSR. No a/t/d. He quit smoking 11 years. CABG done at [facility] 2011 by [facility]. Taking PPI. No major family hx. He has no active cardiac complaints. He is on disability for back. He is on ASA as well. No bleeding. Assessment/Plan   Hx of CABG x 4 (LIMA to LAD, SVG to PDA, SVG to OM1, SVG to RCA) - 2011  EF 50%, NYHA I  DM II  HTN  Hx of cholecystectomy - 4/2021  D/c Metoprolol, increase Bisprolol, add Lisinopril 10 mg q day. Continue ASA/Statin. No other major issues today. Continue PPI. RF management. He is already on [place] and doing well with this. If need another medication, consider Aldactone. The patient was advised on risk/benefits of the new Rx and he agreed to proceed with the medication(s). Discussed diet/exercise/BP/weight loss/health lifestyle choices/lipids; the patient understands the goals and will try to comply.    Disposition: 1 year    Current Outpatient Medications:     tiZANidine (ZANAFLEX) 4 MG tablet, Take 1 tablet by mouth every 6 hours as needed, Disp: , Rfl:     TESTOSTERONE IM, Inject into the muscle once a week, Disp: , Rfl:     lisinopril (PRINIVIL;ZESTRIL) 10 MG tablet, Take 1 tablet by mouth

## 2023-06-23 NOTE — PATIENT INSTRUCTIONS
Continue current medications as prescribed. Concentrate to breathe during times of back pain to prevent heart from racing. If this continues or seems to get worse then call - use office nurse line option #3. If fast heart rate comes on and does not subside seek emergency evaluation with 911. Stay as active as you can. Eat heart healthy. Follow-up with your PCP as scheduled. Follow-up with Dr. Elyssa Gary or Annette Tejeda CNP as scheduled or sooner if need. You may receive a survey regarding the care you received during your visit. Your input is valuable to us. We encourage you to complete and return your survey. We hope you will choose us in the future for your healthcare needs.

## 2023-10-03 ENCOUNTER — TELEPHONE (OUTPATIENT)
Dept: CARDIOLOGY CLINIC | Age: 58
End: 2023-10-03

## 2023-10-03 NOTE — TELEPHONE ENCOUNTER
Pre op Risk Assessment    Procedure COLONOSCOPY  Physician DR Cielo Cunningham  Date of surgery/procedure 10-    Last OV 6-23-23 W/MELVIN  Last Stress NONE IN EPIC  Last Echo 4-7-2022  Last Cath NONE IN EPIC  Last Stent NONE IN EPIC  Is patient on blood thinners ASA 81  MG  Hold Meds/how many days ? ?     [phone]

## 2024-05-31 ENCOUNTER — OFFICE VISIT (OUTPATIENT)
Dept: CARDIOLOGY CLINIC | Age: 59
End: 2024-05-31
Payer: MEDICARE

## 2024-05-31 VITALS
BODY MASS INDEX: 39.05 KG/M2 | DIASTOLIC BLOOD PRESSURE: 75 MMHG | WEIGHT: 272.8 LBS | HEART RATE: 73 BPM | SYSTOLIC BLOOD PRESSURE: 131 MMHG | HEIGHT: 70 IN

## 2024-05-31 DIAGNOSIS — Z95.1 HX OF CABG: ICD-10-CM

## 2024-05-31 DIAGNOSIS — R06.02 SOB (SHORTNESS OF BREATH): ICD-10-CM

## 2024-05-31 DIAGNOSIS — E78.5 HYPERLIPIDEMIA, UNSPECIFIED HYPERLIPIDEMIA TYPE: ICD-10-CM

## 2024-05-31 DIAGNOSIS — I10 HYPERTENSION, UNSPECIFIED TYPE: ICD-10-CM

## 2024-05-31 DIAGNOSIS — I25.10 CORONARY ARTERY DISEASE INVOLVING NATIVE CORONARY ARTERY OF NATIVE HEART WITHOUT ANGINA PECTORIS: Primary | ICD-10-CM

## 2024-05-31 PROCEDURE — 3078F DIAST BP <80 MM HG: CPT | Performed by: NURSE PRACTITIONER

## 2024-05-31 PROCEDURE — 3075F SYST BP GE 130 - 139MM HG: CPT | Performed by: NURSE PRACTITIONER

## 2024-05-31 PROCEDURE — 93000 ELECTROCARDIOGRAM COMPLETE: CPT | Performed by: NURSE PRACTITIONER

## 2024-05-31 PROCEDURE — 99214 OFFICE O/P EST MOD 30 MIN: CPT | Performed by: NURSE PRACTITIONER

## 2024-05-31 RX ORDER — NALOXEGOL OXALATE 25 MG/1
TABLET, FILM COATED ORAL
COMMUNITY
Start: 2024-05-17

## 2024-05-31 RX ORDER — ALBUTEROL SULFATE 90 UG/1
AEROSOL, METERED RESPIRATORY (INHALATION)
COMMUNITY
Start: 2024-04-25

## 2024-05-31 NOTE — PATIENT INSTRUCTIONS
Continue current medications as prescribed.    Stay as active as you can.     Eat heart healthy diet.     Follow-up with your PCP as scheduled.    Follow-up with Lynne CNP in 1 year as scheduled or sooner if need.   Call if heart rate seems to be more bothersome.

## 2024-05-31 NOTE — PROGRESS NOTES
Pt C/O sob, some palpitations in past only lasting a few seconds, fatigue.      Pt denies CP, Headache, dizziness, swelling,  
hyperlipidemia  Summary  Technically difficult examination.  Left ventricular size is normal and systolic function is mildly reduced.  Ejection fraction was estimated at 50%. LV wall thickness is within normal  limits.  The right ventricular size appears normal with normal systolic function  and wall thickness.  Signature  ----------------------------------------------------------------  Electronically signed by Roni Lizarraga MD (Interpreting  physician) on 04/07/2022 at 04:01 PM       Assessment/Plan   Hx of CABG x 4 (LIMA to LAD, SVG to PDA, SVG to OM1, SVG to RCA) - 2011  EF 50%, NYHA I  DM II  HTN  LDL 28;  3/23/23 on Lipitor 40  Hx of cholecystectomy - 4/2021  Back pain - seeing pain management - still not always optimally controlled    Doing well. No angina or evidence of decompensated HF, euvolemic on exam. Tolerating meds. No bleeding on ASA. HR and BP higher at times due to poorly controlled back pain - working with pain management.     Continue current medications as prescribed.    Stay as active as you can.     Eat heart healthy diet.     Follow-up with your PCP as scheduled.    Follow-up with Lynne JIMENEZ in 1 year as scheduled or sooner if need.   Call if heart rate seems to be more bothersome.       Electronically signed by ANNA Keating CNP   6/26/2024 at 9:03 AM EDT

## 2025-05-20 NOTE — PATIENT INSTRUCTIONS
Your Provider for Today: Herminio Pierre PA-C  Your nurses for today: Jessa STRANGE    You may receive a survey regarding the care you received during your visit.  Your input is valuable to us.  We encourage you to complete and return your survey.  We hope you will choose us in the future for your healthcare needs.

## 2025-05-21 ENCOUNTER — OFFICE VISIT (OUTPATIENT)
Dept: CARDIOLOGY CLINIC | Age: 60
End: 2025-05-21
Payer: MEDICARE

## 2025-05-21 VITALS
WEIGHT: 266 LBS | BODY MASS INDEX: 38.08 KG/M2 | SYSTOLIC BLOOD PRESSURE: 144 MMHG | DIASTOLIC BLOOD PRESSURE: 70 MMHG | HEIGHT: 70 IN | HEART RATE: 68 BPM

## 2025-05-21 DIAGNOSIS — E11.9 TYPE 2 DIABETES MELLITUS WITHOUT COMPLICATION, WITH LONG-TERM CURRENT USE OF INSULIN (HCC): ICD-10-CM

## 2025-05-21 DIAGNOSIS — I10 PRIMARY HYPERTENSION: ICD-10-CM

## 2025-05-21 DIAGNOSIS — Z79.4 TYPE 2 DIABETES MELLITUS WITHOUT COMPLICATION, WITH LONG-TERM CURRENT USE OF INSULIN (HCC): ICD-10-CM

## 2025-05-21 DIAGNOSIS — Z95.1 S/P CABG (CORONARY ARTERY BYPASS GRAFT): ICD-10-CM

## 2025-05-21 DIAGNOSIS — E78.5 DYSLIPIDEMIA: Primary | ICD-10-CM

## 2025-05-21 PROCEDURE — 93000 ELECTROCARDIOGRAM COMPLETE: CPT | Performed by: PHYSICIAN ASSISTANT

## 2025-05-21 PROCEDURE — 99214 OFFICE O/P EST MOD 30 MIN: CPT | Performed by: PHYSICIAN ASSISTANT

## 2025-05-21 PROCEDURE — 3078F DIAST BP <80 MM HG: CPT | Performed by: PHYSICIAN ASSISTANT

## 2025-05-21 PROCEDURE — 3077F SYST BP >= 140 MM HG: CPT | Performed by: PHYSICIAN ASSISTANT

## 2025-05-23 NOTE — PROGRESS NOTES
[facility] [place] [facility]  [facility]  [address]  [facility] [address]  Dept: [phone]  Dept Fax: [phone]  Loc: [phone]    Chief Complaint   Patient presents with    Follow-up       History of Present Illness  The patient is a 59-year-old gentleman with a history of four-vessel coronary bypass grafting in 2011, type 2 diabetes mellitus, and hypertension, presenting for a 1-year follow-up.    He reports overall good health since his last visit, with no significant issues. He experiences occasional shortness of breath, which he attributes to his back and right leg problems, including nerve damage that limits his mobility. This often results in prolonged sitting, leading to a sedentary lifestyle. Upon standing, he may experience shortness of breath, but it is not severe enough to warrant immediate medical attention. He recalls his chest pain in 2011 as a sensation of his heart rumbling or fluttering.    His hemoglobin A1c level was elevated at 9.4 during his last check, prompting the initiation of insulin therapy. His blood glucose levels have decreased from a range of 350 to high 400s to approximately 185 currently. His insulin dosage is currently being adjusted.      PAST SURGICAL HISTORY:  Four-vessel coronary bypass grafting in 2011           Cardiologist:  Dr. Abraham        General:   No fever, no chills, No fatigue or weight loss  Pulmonary:    No dyspnea, no wheezing  Cardiac:    Denies recent chest pain   GI:     No nausea or vomiting, no abdominal pain  Neuro:    No dizziness or light headedness  Musculoskeletal:  No recent active issues  Extremities:   No edema, good peripheral pulses      Past Medical History:   Diagnosis Date    CAD (coronary artery disease)     Diabetes mellitus (HCC)     Hyperlipidemia     Hypertension     Movement disorder     Neuromuscular disorder (HCC)     Pneumonia     Psychiatric problem        Allergies   Allergen Reactions

## 2025-05-30 ENCOUNTER — TRANSCRIBE ORDERS (OUTPATIENT)
Dept: ADMINISTRATIVE | Age: 60
End: 2025-05-30

## 2025-05-30 DIAGNOSIS — H49.22 SIXTH NERVE PALSY OF LEFT EYE: ICD-10-CM

## 2025-05-30 DIAGNOSIS — H47.292 OTHER OPTIC ATROPHY, LEFT EYE: Primary | ICD-10-CM

## 2025-06-24 ENCOUNTER — HOSPITAL ENCOUNTER (OUTPATIENT)
Dept: MRI IMAGING | Age: 60
Discharge: HOME | End: 2025-06-24
Attending: STUDENT IN AN ORGANIZED HEALTH CARE EDUCATION/TRAINING PROGRAM
Payer: MEDICARE

## 2025-06-24 DIAGNOSIS — H49.22 SIXTH NERVE PALSY OF LEFT EYE: ICD-10-CM

## 2025-06-24 DIAGNOSIS — H47.292 OTHER OPTIC ATROPHY, LEFT EYE: ICD-10-CM

## 2025-06-24 LAB
CREAT BLD-MCNC: 0.8 MG/DL (ref 0.5–1.2)
GFR SERPL CREATININE-BSD FRML MDRD: > 90 ML/MIN/1.73M2

## 2025-06-24 PROCEDURE — 82565 ASSAY OF CREATININE: CPT

## 2025-06-24 PROCEDURE — A9579 GAD-BASE MR CONTRAST NOS,1ML: HCPCS | Performed by: STUDENT IN AN ORGANIZED HEALTH CARE EDUCATION/TRAINING PROGRAM

## 2025-06-24 PROCEDURE — 6360000004 HC RX CONTRAST MEDICATION: Performed by: STUDENT IN AN ORGANIZED HEALTH CARE EDUCATION/TRAINING PROGRAM

## 2025-06-24 PROCEDURE — 70553 MRI BRAIN STEM W/O & W/DYE: CPT

## 2025-06-24 RX ORDER — GADOTERIDOL 279.3 MG/ML
20 INJECTION INTRAVENOUS
Status: COMPLETED | OUTPATIENT
Start: 2025-06-24 | End: 2025-06-24

## 2025-06-24 RX ADMIN — GADOTERIDOL 20 ML: 279.3 INJECTION, SOLUTION INTRAVENOUS at 11:14

## 2025-08-05 SDOH — HEALTH STABILITY: PHYSICAL HEALTH: ON AVERAGE, HOW MANY DAYS PER WEEK DO YOU ENGAGE IN MODERATE TO STRENUOUS EXERCISE (LIKE A BRISK WALK)?: 0 DAYS

## 2025-08-05 SDOH — HEALTH STABILITY: PHYSICAL HEALTH: ON AVERAGE, HOW MANY MINUTES DO YOU ENGAGE IN EXERCISE AT THIS LEVEL?: 0 MIN

## 2025-08-08 ENCOUNTER — OFFICE VISIT (OUTPATIENT)
Dept: FAMILY MEDICINE CLINIC | Age: 60
End: 2025-08-08
Payer: MEDICARE

## 2025-08-08 ENCOUNTER — TELEPHONE (OUTPATIENT)
Dept: FAMILY MEDICINE CLINIC | Age: 60
End: 2025-08-08

## 2025-08-08 VITALS
RESPIRATION RATE: 19 BRPM | WEIGHT: 269 LBS | BODY MASS INDEX: 38.51 KG/M2 | DIASTOLIC BLOOD PRESSURE: 82 MMHG | HEIGHT: 70 IN | SYSTOLIC BLOOD PRESSURE: 132 MMHG | TEMPERATURE: 97.6 F | HEART RATE: 72 BPM | OXYGEN SATURATION: 96 %

## 2025-08-08 DIAGNOSIS — E11.65 TYPE 2 DIABETES MELLITUS WITH HYPERGLYCEMIA, WITH LONG-TERM CURRENT USE OF INSULIN (HCC): ICD-10-CM

## 2025-08-08 DIAGNOSIS — G89.29 CHRONIC BILATERAL LOW BACK PAIN WITH BILATERAL SCIATICA: ICD-10-CM

## 2025-08-08 DIAGNOSIS — Z79.4 TYPE 2 DIABETES MELLITUS WITH HYPERGLYCEMIA, WITH LONG-TERM CURRENT USE OF INSULIN (HCC): ICD-10-CM

## 2025-08-08 DIAGNOSIS — M54.42 CHRONIC BILATERAL LOW BACK PAIN WITH BILATERAL SCIATICA: ICD-10-CM

## 2025-08-08 DIAGNOSIS — F33.1 MODERATE EPISODE OF RECURRENT MAJOR DEPRESSIVE DISORDER (HCC): ICD-10-CM

## 2025-08-08 DIAGNOSIS — I10 HYPERTENSION, UNSPECIFIED TYPE: Primary | ICD-10-CM

## 2025-08-08 DIAGNOSIS — M54.41 CHRONIC BILATERAL LOW BACK PAIN WITH BILATERAL SCIATICA: ICD-10-CM

## 2025-08-08 DIAGNOSIS — F41.9 ANXIETY: ICD-10-CM

## 2025-08-08 DIAGNOSIS — I25.10 CORONARY ARTERY DISEASE INVOLVING NATIVE HEART WITHOUT ANGINA PECTORIS, UNSPECIFIED VESSEL OR LESION TYPE: ICD-10-CM

## 2025-08-08 PROCEDURE — 99205 OFFICE O/P NEW HI 60 MIN: CPT

## 2025-08-08 PROCEDURE — 3075F SYST BP GE 130 - 139MM HG: CPT

## 2025-08-08 PROCEDURE — 3079F DIAST BP 80-89 MM HG: CPT

## 2025-08-08 RX ORDER — IPRATROPIUM BROMIDE 21 UG/1
2 SPRAY, METERED NASAL DAILY
COMMUNITY
Start: 2025-06-19

## 2025-08-08 RX ORDER — LISINOPRIL 10 MG/1
10 TABLET ORAL DAILY
Qty: 90 TABLET | Refills: 1 | Status: SHIPPED | OUTPATIENT
Start: 2025-08-08

## 2025-08-08 RX ORDER — INSULIN GLARGINE 300 U/ML
INJECTION, SOLUTION SUBCUTANEOUS 2 TIMES DAILY
COMMUNITY
Start: 2025-07-24

## 2025-08-08 RX ORDER — PEN NEEDLE, DIABETIC 32 GX 1/4"
NEEDLE, DISPOSABLE MISCELLANEOUS 2 TIMES DAILY
COMMUNITY
Start: 2025-05-14

## 2025-08-08 RX ORDER — BISOPROLOL FUMARATE 10 MG/1
5 TABLET, FILM COATED ORAL DAILY
Qty: 30 TABLET | Refills: 0
Start: 2025-08-08

## 2025-08-08 RX ORDER — DULAGLUTIDE 3 MG/.5ML
3 INJECTION, SOLUTION SUBCUTANEOUS WEEKLY
COMMUNITY
Start: 2025-08-06

## 2025-08-08 SDOH — ECONOMIC STABILITY: FOOD INSECURITY: WITHIN THE PAST 12 MONTHS, THE FOOD YOU BOUGHT JUST DIDN'T LAST AND YOU DIDN'T HAVE MONEY TO GET MORE.: NEVER TRUE

## 2025-08-08 SDOH — ECONOMIC STABILITY: FOOD INSECURITY: WITHIN THE PAST 12 MONTHS, YOU WORRIED THAT YOUR FOOD WOULD RUN OUT BEFORE YOU GOT MONEY TO BUY MORE.: NEVER TRUE

## 2025-08-08 ASSESSMENT — ENCOUNTER SYMPTOMS
CONSTIPATION: 0
NAUSEA: 0
SHORTNESS OF BREATH: 0
BACK PAIN: 1
APNEA: 0
SINUS PRESSURE: 0
ABDOMINAL PAIN: 0
RHINORRHEA: 0
COLOR CHANGE: 0
SORE THROAT: 0
WHEEZING: 0
DIARRHEA: 0
VOMITING: 0
COUGH: 0

## 2025-08-08 ASSESSMENT — PATIENT HEALTH QUESTIONNAIRE - PHQ9
SUM OF ALL RESPONSES TO PHQ QUESTIONS 1-9: 0
2. FEELING DOWN, DEPRESSED OR HOPELESS: NOT AT ALL
1. LITTLE INTEREST OR PLEASURE IN DOING THINGS: NOT AT ALL
SUM OF ALL RESPONSES TO PHQ QUESTIONS 1-9: 0

## 2025-09-03 ENCOUNTER — PATIENT MESSAGE (OUTPATIENT)
Dept: FAMILY MEDICINE CLINIC | Age: 60
End: 2025-09-03

## (undated) DEVICE — CLIP INT L POLYMER LOK LIG HEM O LOK

## (undated) DEVICE — PUMP SUC IRR TBNG L10FT W/ HNDPC ASSEMB STRYKEFLOW 2

## (undated) DEVICE — CATHETER CHOLGM 4.5FR L18IN TIP 5.5FR W/ MTL SUPP TB TAUT

## (undated) DEVICE — APPLIER CLP M/L SHFT DIA5MM 15 LIG LIGAMAX 5

## (undated) DEVICE — COLUMN DRAPE

## (undated) DEVICE — GENERAL LAPAROSCOPY PACK-LF: Brand: MEDLINE INDUSTRIES, INC.

## (undated) DEVICE — SEAL

## (undated) DEVICE — ARM DRAPE

## (undated) DEVICE — GOWN,SIRUS,NON REINFRCD,LARGE,SET IN SL: Brand: MEDLINE

## (undated) DEVICE — GLOVE SURG SZ 7 L12IN FNGR THK94MIL TRNSLUC YEL LTX HYDRGEL

## (undated) DEVICE — TIP COVER ACCESSORY

## (undated) DEVICE — BLADELESS OBTURATOR: Brand: WECK VISTA

## (undated) DEVICE — CANNULA SEAL

## (undated) DEVICE — BAG SPEC REM 224ML W4XL6IN DIA10MM 1 HND GYN DISP ENDOPCH

## (undated) DEVICE — SOLUTION ANTIFOG VIS SYS CLEARIFY LAPSCP

## (undated) DEVICE — INTRODUCER CATH L3.5IN DIA7.5FR FOR CHOLANGIOGRAPHY TAUT

## (undated) DEVICE — BASIC SINGLE BASIN BTC-LF: Brand: MEDLINE INDUSTRIES, INC.

## (undated) DEVICE — TUBING INSUFFLATOR HEAT HUMIDIFIED SMK EVAC SET PNEUMOCLEAR

## (undated) DEVICE — ELECTRO LUBE IS A SINGLE PATIENT USE DEVICE THAT IS INTENDED TO BE USED ON ELECTROSURGICAL ELECTRODES TO REDUCE STICKING.: Brand: KEY SURGICAL ELECTRO LUBE

## (undated) DEVICE — ADHESIVE SKIN CLSR 0.7ML TOP DERMBND ADV

## (undated) DEVICE — SUTURE ETHBND D SPEC NO 0 UR 6 30IN D9436

## (undated) DEVICE — TROCAR: Brand: KII FIOS FIRST ENTRY

## (undated) DEVICE — REDUCER: Brand: ENDOWRIST